# Patient Record
Sex: FEMALE | Race: WHITE | NOT HISPANIC OR LATINO | Employment: OTHER | URBAN - METROPOLITAN AREA
[De-identification: names, ages, dates, MRNs, and addresses within clinical notes are randomized per-mention and may not be internally consistent; named-entity substitution may affect disease eponyms.]

---

## 2023-07-03 ENCOUNTER — TELEPHONE (OUTPATIENT)
Dept: PRIMARY CARE | Facility: CLINIC | Age: 78
End: 2023-07-03
Payer: MEDICARE

## 2023-07-03 DIAGNOSIS — E55.9 VITAMIN D DEFICIENCY: ICD-10-CM

## 2023-07-03 DIAGNOSIS — E78.00 HYPERCHOLESTEREMIA: ICD-10-CM

## 2023-07-03 DIAGNOSIS — R53.83 OTHER FATIGUE: ICD-10-CM

## 2023-07-07 ENCOUNTER — OFFICE VISIT (OUTPATIENT)
Dept: PRIMARY CARE | Facility: CLINIC | Age: 78
End: 2023-07-07
Payer: MEDICARE

## 2023-07-07 ENCOUNTER — LAB (OUTPATIENT)
Dept: LAB | Facility: LAB | Age: 78
End: 2023-07-07
Payer: MEDICARE

## 2023-07-07 VITALS
BODY MASS INDEX: 32.25 KG/M2 | WEIGHT: 160 LBS | DIASTOLIC BLOOD PRESSURE: 64 MMHG | HEART RATE: 97 BPM | OXYGEN SATURATION: 97 % | TEMPERATURE: 97.1 F | RESPIRATION RATE: 16 BRPM | SYSTOLIC BLOOD PRESSURE: 124 MMHG | HEIGHT: 59 IN

## 2023-07-07 DIAGNOSIS — E55.9 VITAMIN D DEFICIENCY: ICD-10-CM

## 2023-07-07 DIAGNOSIS — R53.83 OTHER FATIGUE: ICD-10-CM

## 2023-07-07 DIAGNOSIS — E78.5 HYPERLIPIDEMIA, UNSPECIFIED HYPERLIPIDEMIA TYPE: ICD-10-CM

## 2023-07-07 DIAGNOSIS — E53.8 VITAMIN B 12 DEFICIENCY: ICD-10-CM

## 2023-07-07 DIAGNOSIS — K21.00 GASTROESOPHAGEAL REFLUX DISEASE WITH ESOPHAGITIS WITHOUT HEMORRHAGE: ICD-10-CM

## 2023-07-07 DIAGNOSIS — Z00.00 ENCOUNTER FOR ANNUAL GENERAL MEDICAL EXAMINATION WITHOUT ABNORMAL FINDINGS IN ADULT: ICD-10-CM

## 2023-07-07 DIAGNOSIS — R07.9 CHEST PAIN, UNSPECIFIED TYPE: ICD-10-CM

## 2023-07-07 DIAGNOSIS — E78.00 HYPERCHOLESTEREMIA: ICD-10-CM

## 2023-07-07 DIAGNOSIS — K21.9 GASTROESOPHAGEAL REFLUX DISEASE, UNSPECIFIED WHETHER ESOPHAGITIS PRESENT: Primary | ICD-10-CM

## 2023-07-07 PROCEDURE — 82306 VITAMIN D 25 HYDROXY: CPT

## 2023-07-07 PROCEDURE — 84443 ASSAY THYROID STIM HORMONE: CPT

## 2023-07-07 PROCEDURE — 1160F RVW MEDS BY RX/DR IN RCRD: CPT | Performed by: FAMILY MEDICINE

## 2023-07-07 PROCEDURE — 1036F TOBACCO NON-USER: CPT | Performed by: FAMILY MEDICINE

## 2023-07-07 PROCEDURE — 93000 ELECTROCARDIOGRAM COMPLETE: CPT | Performed by: FAMILY MEDICINE

## 2023-07-07 PROCEDURE — 99214 OFFICE O/P EST MOD 30 MIN: CPT | Performed by: FAMILY MEDICINE

## 2023-07-07 PROCEDURE — 85025 COMPLETE CBC W/AUTO DIFF WBC: CPT

## 2023-07-07 PROCEDURE — 36415 COLL VENOUS BLD VENIPUNCTURE: CPT

## 2023-07-07 PROCEDURE — 80053 COMPREHEN METABOLIC PANEL: CPT

## 2023-07-07 PROCEDURE — 82607 VITAMIN B-12: CPT

## 2023-07-07 PROCEDURE — 1159F MED LIST DOCD IN RCRD: CPT | Performed by: FAMILY MEDICINE

## 2023-07-07 PROCEDURE — 80061 LIPID PANEL: CPT

## 2023-07-07 RX ORDER — PITAVASTATIN CALCIUM 4.18 MG/1
25 TABLET, FILM COATED ORAL DAILY
COMMUNITY
End: 2023-11-17 | Stop reason: WASHOUT

## 2023-07-07 RX ORDER — PANTOPRAZOLE SODIUM 40 MG/1
40 TABLET, DELAYED RELEASE ORAL DAILY
Qty: 30 TABLET | Refills: 1 | Status: SHIPPED | OUTPATIENT
Start: 2023-07-07 | End: 2023-08-01

## 2023-07-07 RX ORDER — IBUPROFEN 600 MG/1
TABLET ORAL
COMMUNITY
Start: 2022-10-26

## 2023-07-07 ASSESSMENT — ENCOUNTER SYMPTOMS
ACTIVITY CHANGE: 0
DIARRHEA: 0
PALPITATIONS: 0
ABDOMINAL PAIN: 0
ABDOMINAL DISTENTION: 0
OCCASIONAL FEELINGS OF UNSTEADINESS: 0
LOSS OF SENSATION IN FEET: 0
FATIGUE: 0
VOMITING: 0
COLOR CHANGE: 0
DEPRESSION: 0
CHOKING: 0
COUGH: 0
HEADACHES: 0
CONSTIPATION: 0
NAUSEA: 0
ARTHRALGIAS: 0
BACK PAIN: 0
FACIAL ASYMMETRY: 0
RECTAL PAIN: 0
CHEST TIGHTNESS: 0
DIZZINESS: 0
LIGHT-HEADEDNESS: 0
FEVER: 0
APPETITE CHANGE: 0

## 2023-07-07 ASSESSMENT — PATIENT HEALTH QUESTIONNAIRE - PHQ9
1. LITTLE INTEREST OR PLEASURE IN DOING THINGS: NOT AT ALL
SUM OF ALL RESPONSES TO PHQ9 QUESTIONS 1 AND 2: 0
2. FEELING DOWN, DEPRESSED OR HOPELESS: NOT AT ALL

## 2023-07-07 NOTE — PROGRESS NOTES
"Subjective   Patient ID: Julia Antonio is a 78 y.o. female who presents for heartburn  (Off and on ).    HPI     Review of Systems   Constitutional:  Negative for activity change, appetite change, fatigue and fever.   HENT:  Negative for congestion.    Respiratory:  Negative for cough, choking and chest tightness.    Cardiovascular:  Negative for chest pain, palpitations and leg swelling.   Gastrointestinal:  Negative for abdominal distention, abdominal pain, constipation, diarrhea, nausea, rectal pain and vomiting.        Heartburn even with water   Musculoskeletal:  Negative for arthralgias, back pain and gait problem.   Skin:  Negative for color change and pallor.   Neurological:  Negative for dizziness, facial asymmetry, light-headedness and headaches.       Objective   /64   Pulse 97   Temp 36.2 °C (97.1 °F)   Resp 16   Ht 1.499 m (4' 11\")   Wt 72.6 kg (160 lb)   SpO2 97%   BMI 32.32 kg/m²   BSA Body surface area is 1.74 meters squared.      Physical Exam  Constitutional:       General: She is not in acute distress.     Appearance: Normal appearance. She is not toxic-appearing.   HENT:      Head: Normocephalic.      Right Ear: Tympanic membrane, ear canal and external ear normal.      Left Ear: Tympanic membrane, ear canal and external ear normal.      Mouth/Throat:      Pharynx: Oropharynx is clear.   Eyes:      Conjunctiva/sclera: Conjunctivae normal.      Pupils: Pupils are equal, round, and reactive to light.   Cardiovascular:      Rate and Rhythm: Normal rate and regular rhythm.      Pulses: Normal pulses.      Heart sounds: Normal heart sounds.   Pulmonary:      Effort: No respiratory distress.      Breath sounds: No wheezing, rhonchi or rales.   Abdominal:      General: Bowel sounds are normal. There is no distension.      Palpations: Abdomen is soft.      Tenderness: There is no abdominal tenderness.      Comments: Slight discomfort in epigastrium   Musculoskeletal:         General: No " swelling or tenderness.      Cervical back: No tenderness.   Skin:     Findings: No lesion or rash.   Neurological:      General: No focal deficit present.      Mental Status: She is alert and oriented to person, place, and time. Mental status is at baseline.      Gait: Gait normal.   Psychiatric:         Mood and Affect: Mood normal.         Behavior: Behavior normal.         Thought Content: Thought content normal.         Judgment: Judgment normal.       [facility] Encounter on 08/17/2022   Component Date Value Ref Range Status    BNP 08/17/2022 16  0 - 99 pg/mL Final    Comment: .  <100 pg/mL - Heart failure unlikely  100-299 pg/mL - Intermediate probability of acute heart  .               failure exacerbation. Correlate with clinical  .               context and patient history.    >=300 pg/mL - Heart Failure likely. Correlate with clinical  .               context and patient history.   Biotin interference may cause falsely decreased results.   Patients taking a Biotin dose of up to 5 mg/day should   refrain from taking Biotin for 24 hours before sample   collection. Providers may contact their local laboratory   for further information.     [facility] Encounter on 07/08/2022   Component Date Value Ref Range Status    WBC 07/08/2022 4.5  4.4 - 11.3 x10E9/L Final    nRBC 07/08/2022 0.0  0.0 - 0.0 /100 WBC Final    RBC 07/08/2022 4.46  4.00 - 5.20 x10E12/L Final    Hemoglobin 07/08/2022 13.4  12.0 - 16.0 g/dL Final    Hematocrit 07/08/2022 41.2  36.0 - 46.0 % Final    MCV 07/08/2022 92  80 - 100 fL Final    MCHC 07/08/2022 32.5  32.0 - 36.0 g/dL Final    Platelets 07/08/2022 236  150 - 450 x10E9/L Final    RDW 07/08/2022 11.9  11.5 - 14.5 % Final    Neutrophils % 07/08/2022 58.8  40.0 - 80.0 % Final    Immature Granulocytes %, Automated 07/08/2022 0.2  0.0 - 0.9 % Final    Comment:  Immature Granulocyte Count (IG) includes promyelocytes,    myelocytes and metamyelocytes but does not include bands.   Percent differential  counts (%) should be interpreted in the   context of the absolute cell counts (cells/L).      Lymphocytes % 07/08/2022 25.3  13.0 - 44.0 % Final    Monocytes % 07/08/2022 12.8  2.0 - 10.0 % Final    Eosinophils % 07/08/2022 2.2  0.0 - 6.0 % Final    Basophils % 07/08/2022 0.7  0.0 - 2.0 % Final    Neutrophils Absolute 07/08/2022 2.62  1.60 - 5.50 x10E9/L Final    Lymphocytes Absolute 07/08/2022 1.13  0.80 - 3.00 x10E9/L Final    Monocytes Absolute 07/08/2022 0.57  0.05 - 0.80 x10E9/L Final    Eosinophils Absolute 07/08/2022 0.10  0.00 - 0.40 x10E9/L Final    Basophils Absolute 07/08/2022 0.03  0.00 - 0.10 x10E9/L Final    Glucose 07/08/2022 100 (H)  74 - 99 mg/dL Final    Sodium 07/08/2022 142  136 - 145 mmol/L Final    Potassium 07/08/2022 4.8  3.5 - 5.3 mmol/L Final    Chloride 07/08/2022 105  98 - 107 mmol/L Final    Bicarbonate 07/08/2022 28  21 - 32 mmol/L Final    Anion Gap 07/08/2022 14  10 - 20 mmol/L Final    Urea Nitrogen 07/08/2022 15  6 - 23 mg/dL Final    Creatinine 07/08/2022 0.74  0.50 - 1.05 mg/dL Final    GFR Female 07/08/2022 82  >90 mL/min/1.73m2 Final    Comment:  CALCULATIONS OF ESTIMATED GFR ARE PERFORMED   USING THE 2021 CKD-EPI STUDY REFIT EQUATION   WITHOUT THE RACE VARIABLE FOR THE IDMS-TRACEABLE   CREATININE METHODS.    https://jasn.asnjournals.org/content/early/2021/09/22/ASN.8794511990      Calcium 07/08/2022 9.2  8.6 - 10.6 mg/dL Final    Albumin 07/08/2022 4.1  3.4 - 5.0 g/dL Final    Alkaline Phosphatase 07/08/2022 73  33 - 136 U/L Final    Total Protein 07/08/2022 6.8  6.4 - 8.2 g/dL Final    AST 07/08/2022 19  9 - 39 U/L Final    Total Bilirubin 07/08/2022 0.6  0.0 - 1.2 mg/dL Final    ALT (SGPT) 07/08/2022 15  7 - 45 U/L Final    Comment:  Patients treated with Sulfasalazine may generate    falsely decreased results for ALT.      Cholesterol 07/08/2022 173  0 - 199 mg/dL Final    Comment: .      AGE      DESIRABLE   BORDERLINE HIGH   HIGH     0-19 Y     0 - 169       170 - 199      >/= 200    20-24 Y     0 - 189       190 - 224     >/= 225         >24 Y     0 - 199       200 - 239     >/= 240   **All ranges are based on fasting samples. Specific   therapeutic targets will vary based on patient-specific   cardiac risk.  .   Pediatric guidelines reference:Pediatrics 2011, 128(S5).   Adult guidelines reference: NCEP ATPIII Guidelines,     SAURABH 2001, 258:2486-97  .   Venipuncture immediately after or during the    administration of Metamizole may lead to falsely   low results. Testing should be performed immediately   prior to Metamizole dosing.      HDL 07/08/2022 57.6  mg/dL Final    Comment: .      AGE      VERY LOW   LOW     NORMAL    HIGH       0-19 Y       < 35   < 40     40-45     ----    20-24 Y       ----   < 40       >45     ----      >24 Y       ----   < 40     40-60      >60  .      Cholesterol/HDL Ratio 07/08/2022 3.0   Final    Comment: REF VALUES  DESIRABLE  < 3.4  HIGH RISK  > 5.0      LDL 07/08/2022 95  0 - 99 mg/dL Final    Comment: .                           NEAR      BORD      AGE      DESIRABLE  OPTIMAL    HIGH     HIGH     VERY HIGH     0-19 Y     0 - 109     ---    110-129   >/= 130     ----    20-24 Y     0 - 119     ---    120-159   >/= 160     ----      >24 Y     0 -  99   100-129  130-159   160-189     >/=190  .      VLDL 07/08/2022 21  0 - 40 mg/dL Final    Triglycerides 07/08/2022 104  0 - 149 mg/dL Final    Comment: .      AGE      DESIRABLE   BORDERLINE HIGH   HIGH     VERY HIGH   0 D-90 D    19 - 174         ----         ----        ----  91 D- 9 Y     0 -  74        75 -  99     >/= 100      ----    10-19 Y     0 -  89        90 - 129     >/= 130      ----    20-24 Y     0 - 114       115 - 149     >/= 150      ----         >24 Y     0 - 149       150 - 199    200- 499    >/= 500  .   Venipuncture immediately after or during the    administration of Metamizole may lead to falsely   low results. Testing should be performed immediately   prior to Metamizole  dosing.      Vitamin D, 25-Hydroxy 07/08/2022 31  ng/mL Final    Comment: .  DEFICIENCY:         < 20   NG/ML  INSUFFICIENCY:      20-29  NG/ML  SUFFICIENCY:         NG/ML    THIS ASSAY ACCURATELY QUANTIFIES THE SUM OF  VITAMIN D3, 25-HYDROXY AND VIT D2,25-HYDROXY.      TSH 07/08/2022 2.75  0.44 - 3.98 mIU/L Final    Comment:  TSH testing is performed using different testing    methodology at [facility] than at other    [facility]. Direct result comparisons should    only be made within the same method.       Current Outpatient Medications on File Prior to Visit   Medication Sig Dispense Refill    ibuprofen 600 mg tablet TAKE ONE TABLET BY MOUTH THREE TIMES DAILY WITH FOOD AS NEEDED      Livalo 4 mg tablet Take 25 mg by mouth once daily.       No current facility-administered medications on file prior to visit.     No images are attached to the encounter.            Assessment/Plan   Diagnoses and all orders for this visit:  Gastroesophageal reflux disease, unspecified whether esophagitis present  -     pantoprazole (ProtoNix) 40 mg EC tablet; Take 1 tablet (40 mg) by mouth once daily. Do not crush, chew, or split.  -     ECG 12 lead (Clinic Performed)  Chest pain, unspecified type  -     ECG 12 lead (Clinic Performed)    Patient to see Dr. Stevens for EGD  Patient to start on pantoprazole  Patient to call if worsening symptoms

## 2023-07-08 LAB
ALANINE AMINOTRANSFERASE (SGPT) (U/L) IN SER/PLAS: 20 U/L (ref 7–45)
ALBUMIN (G/DL) IN SER/PLAS: 4.6 G/DL (ref 3.4–5)
ALKALINE PHOSPHATASE (U/L) IN SER/PLAS: 75 U/L (ref 33–136)
ANION GAP IN SER/PLAS: 14 MMOL/L (ref 10–20)
ASPARTATE AMINOTRANSFERASE (SGOT) (U/L) IN SER/PLAS: 24 U/L (ref 9–39)
BASOPHILS (10*3/UL) IN BLOOD BY AUTOMATED COUNT: 0.02 X10E9/L (ref 0–0.1)
BASOPHILS/100 LEUKOCYTES IN BLOOD BY AUTOMATED COUNT: 0.4 % (ref 0–2)
BILIRUBIN TOTAL (MG/DL) IN SER/PLAS: 0.6 MG/DL (ref 0–1.2)
CALCIDIOL (25 OH VITAMIN D3) (NG/ML) IN SER/PLAS: 25 NG/ML
CALCIUM (MG/DL) IN SER/PLAS: 9.7 MG/DL (ref 8.6–10.6)
CARBON DIOXIDE, TOTAL (MMOL/L) IN SER/PLAS: 28 MMOL/L (ref 21–32)
CHLORIDE (MMOL/L) IN SER/PLAS: 103 MMOL/L (ref 98–107)
CHOLESTEROL (MG/DL) IN SER/PLAS: 190 MG/DL (ref 0–199)
CHOLESTEROL IN HDL (MG/DL) IN SER/PLAS: 59.6 MG/DL
CHOLESTEROL/HDL RATIO: 3.2
COBALAMIN (VITAMIN B12) (PG/ML) IN SER/PLAS: 337 PG/ML (ref 211–911)
CREATININE (MG/DL) IN SER/PLAS: 0.71 MG/DL (ref 0.5–1.05)
EOSINOPHILS (10*3/UL) IN BLOOD BY AUTOMATED COUNT: 0.09 X10E9/L (ref 0–0.4)
EOSINOPHILS/100 LEUKOCYTES IN BLOOD BY AUTOMATED COUNT: 1.7 % (ref 0–6)
ERYTHROCYTE DISTRIBUTION WIDTH (RATIO) BY AUTOMATED COUNT: 12.8 % (ref 11.5–14.5)
ERYTHROCYTE MEAN CORPUSCULAR HEMOGLOBIN CONCENTRATION (G/DL) BY AUTOMATED: 31.4 G/DL (ref 32–36)
ERYTHROCYTE MEAN CORPUSCULAR VOLUME (FL) BY AUTOMATED COUNT: 93 FL (ref 80–100)
ERYTHROCYTES (10*6/UL) IN BLOOD BY AUTOMATED COUNT: 4.43 X10E12/L (ref 4–5.2)
GFR FEMALE: 86 ML/MIN/1.73M2
GLUCOSE (MG/DL) IN SER/PLAS: 93 MG/DL (ref 74–99)
HEMATOCRIT (%) IN BLOOD BY AUTOMATED COUNT: 41.4 % (ref 36–46)
HEMOGLOBIN (G/DL) IN BLOOD: 13 G/DL (ref 12–16)
IMMATURE GRANULOCYTES/100 LEUKOCYTES IN BLOOD BY AUTOMATED COUNT: 0.2 % (ref 0–0.9)
LDL: 97 MG/DL (ref 0–99)
LEUKOCYTES (10*3/UL) IN BLOOD BY AUTOMATED COUNT: 5.4 X10E9/L (ref 4.4–11.3)
LYMPHOCYTES (10*3/UL) IN BLOOD BY AUTOMATED COUNT: 1.48 X10E9/L (ref 0.8–3)
LYMPHOCYTES/100 LEUKOCYTES IN BLOOD BY AUTOMATED COUNT: 27.3 % (ref 13–44)
MONOCYTES (10*3/UL) IN BLOOD BY AUTOMATED COUNT: 0.63 X10E9/L (ref 0.05–0.8)
MONOCYTES/100 LEUKOCYTES IN BLOOD BY AUTOMATED COUNT: 11.6 % (ref 2–10)
NEUTROPHILS (10*3/UL) IN BLOOD BY AUTOMATED COUNT: 3.2 X10E9/L (ref 1.6–5.5)
NEUTROPHILS/100 LEUKOCYTES IN BLOOD BY AUTOMATED COUNT: 58.8 % (ref 40–80)
NRBC (PER 100 WBCS) BY AUTOMATED COUNT: 0 /100 WBC (ref 0–0)
PLATELETS (10*3/UL) IN BLOOD AUTOMATED COUNT: 218 X10E9/L (ref 150–450)
POTASSIUM (MMOL/L) IN SER/PLAS: 4.8 MMOL/L (ref 3.5–5.3)
PROTEIN TOTAL: 7.1 G/DL (ref 6.4–8.2)
SODIUM (MMOL/L) IN SER/PLAS: 140 MMOL/L (ref 136–145)
THYROTROPIN (MIU/L) IN SER/PLAS BY DETECTION LIMIT <= 0.05 MIU/L: 2.62 MIU/L (ref 0.44–3.98)
TRIGLYCERIDE (MG/DL) IN SER/PLAS: 167 MG/DL (ref 0–149)
UREA NITROGEN (MG/DL) IN SER/PLAS: 14 MG/DL (ref 6–23)
VLDL: 33 MG/DL (ref 0–40)

## 2023-07-14 ENCOUNTER — TELEPHONE (OUTPATIENT)
Dept: PRIMARY CARE | Facility: CLINIC | Age: 78
End: 2023-07-14
Payer: MEDICARE

## 2023-08-01 DIAGNOSIS — K21.9 GASTROESOPHAGEAL REFLUX DISEASE, UNSPECIFIED WHETHER ESOPHAGITIS PRESENT: ICD-10-CM

## 2023-08-01 RX ORDER — PANTOPRAZOLE SODIUM 40 MG/1
40 TABLET, DELAYED RELEASE ORAL DAILY
Qty: 30 TABLET | Refills: 1 | Status: SHIPPED | OUTPATIENT
Start: 2023-08-01 | End: 2023-08-25

## 2023-08-16 ASSESSMENT — ENCOUNTER SYMPTOMS
BACK PAIN: 0
COLOR CHANGE: 0
ARTHRALGIAS: 0
FACIAL ASYMMETRY: 0
APPETITE CHANGE: 0
LIGHT-HEADEDNESS: 0
DIZZINESS: 0
CHOKING: 0
FEVER: 0
CHEST TIGHTNESS: 0
PALPITATIONS: 0
FATIGUE: 0
ACTIVITY CHANGE: 0
HEADACHES: 0
COUGH: 0

## 2023-08-16 NOTE — PROGRESS NOTES
"Subjective   Reason for Visit: Julia Antonio is an 78 y.o. female here for a Medicare Wellness visit.     Past Medical, Surgical, and Family History reviewed and updated in chart.    Reviewed all medications by prescribing practitioner or clinical pharmacist (such as prescriptions, OTCs, herbal therapies and supplements) and documented in the medical record.    HPI  Patient presents for physical exam.     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 Solomon     Patient denies other complaints.   Patient Self Assessment of Health Status  Patient Self Assessment: Good    Nutrition and Exercise  Current Diet: Well Balanced Diet  Adequate Fluid Intake: No  Caffeine: Yes  Exercise Frequency: Regularly    Functional Ability/Level of Safety  Cognitive Impairment Observed: No cognitive impairment observed  Cognitive Impairment Reported: No cognitive impairment reported by patient or family    Home Safety Risk Factors: None    Patient Care Team:  Heaven Fletcher DO as PCP - General  Heaven Fletcher DO as PCP - [facility] Attributed Provider  Lucho Stevens DO as Referring Physician (Gastroenterology)     Review of Systems   Constitutional:  Negative for activity change, appetite change, fatigue and fever.   HENT:  Negative for congestion.    Respiratory:  Negative for cough, choking and chest tightness.    Cardiovascular:  Negative for chest pain, palpitations and leg swelling.   Musculoskeletal:  Negative for arthralgias, back pain and gait problem.   Skin:  Negative for color change and pallor.   Neurological:  Negative for dizziness, facial asymmetry, light-headedness and headaches.       Objective   Vitals:  /68 (BP Location: Left arm)   Pulse 90   Ht 1.492 m (4' 10.75\")   Wt 72.9 kg (160 lb 12.8 oz)  "  BMI 32.75 kg/m²       Physical Exam  Constitutional:       General: She is not in acute distress.     Appearance: Normal appearance. She is not toxic-appearing.   HENT:      Head: Normocephalic.      Right Ear: Tympanic membrane, ear canal and external ear normal.      Left Ear: Tympanic membrane, ear canal and external ear normal.      Nose: Nose normal.      Mouth/Throat:      Pharynx: Oropharynx is clear.   Eyes:      Conjunctiva/sclera: Conjunctivae normal.      Pupils: Pupils are equal, round, and reactive to light.   Cardiovascular:      Rate and Rhythm: Normal rate and regular rhythm.      Pulses: Normal pulses.      Heart sounds: Normal heart sounds.   Pulmonary:      Effort: No respiratory distress.      Breath sounds: No wheezing, rhonchi or rales.   Abdominal:      General: Bowel sounds are normal. There is no distension.      Palpations: Abdomen is soft.      Tenderness: There is no abdominal tenderness.   Musculoskeletal:         General: No swelling or tenderness.      Cervical back: No tenderness.   Skin:     Findings: No lesion or rash.   Neurological:      General: No focal deficit present.      Mental Status: She is alert and oriented to person, place, and time. Mental status is at baseline.      Gait: Gait normal.   Psychiatric:         Mood and Affect: Mood normal.         Behavior: Behavior normal.         Thought Content: Thought content normal.         Judgment: Judgment normal.         Assessment/Plan   Problem List Items Addressed This Visit    None  Visit Diagnoses       Healthcare maintenance    -  Primary          1.  Patient's blood work discussed at this office visit  2.  Patient's LDL goal <100, current LDL 97  3.  Patient's triglyceride goal less than 150, current triglycerides 167, start on TYPE IV diet  4.  Patient's vitamin D level low start vitamin D3 2000 units daily  5.  Mammogram due 2023  6.  DEXA 2010 osteoporosis due 2023  7.  Colonoscopy 2011 due 2023 Dr. Solomon  8.   Patient to call if questions or concerns

## 2023-08-17 ENCOUNTER — OFFICE VISIT (OUTPATIENT)
Dept: PRIMARY CARE | Facility: CLINIC | Age: 78
End: 2023-08-17
Payer: MEDICARE

## 2023-08-17 VITALS
SYSTOLIC BLOOD PRESSURE: 142 MMHG | WEIGHT: 160.8 LBS | HEIGHT: 59 IN | BODY MASS INDEX: 32.42 KG/M2 | DIASTOLIC BLOOD PRESSURE: 68 MMHG | HEART RATE: 90 BPM

## 2023-08-17 DIAGNOSIS — Z78.0 ASYMPTOMATIC MENOPAUSAL STATE: ICD-10-CM

## 2023-08-17 DIAGNOSIS — R32 URINARY INCONTINENCE, UNSPECIFIED TYPE: ICD-10-CM

## 2023-08-17 DIAGNOSIS — H92.01 RIGHT EAR PAIN: ICD-10-CM

## 2023-08-17 DIAGNOSIS — Z12.4 SCREENING FOR CERVICAL CANCER: ICD-10-CM

## 2023-08-17 DIAGNOSIS — M54.40 ACUTE MIDLINE LOW BACK PAIN WITH SCIATICA, SCIATICA LATERALITY UNSPECIFIED: ICD-10-CM

## 2023-08-17 DIAGNOSIS — R39.81 FUNCTIONAL URINARY INCONTINENCE: ICD-10-CM

## 2023-08-17 DIAGNOSIS — Z00.00 ROUTINE GENERAL MEDICAL EXAMINATION AT HEALTH CARE FACILITY: ICD-10-CM

## 2023-08-17 DIAGNOSIS — Z71.89 ADVANCE DIRECTIVE DISCUSSED WITH PATIENT: ICD-10-CM

## 2023-08-17 DIAGNOSIS — Z00.00 HEALTHCARE MAINTENANCE: Primary | ICD-10-CM

## 2023-08-17 DIAGNOSIS — Z12.31 ENCOUNTER FOR SCREENING MAMMOGRAM FOR BREAST CANCER: ICD-10-CM

## 2023-08-17 DIAGNOSIS — Z78.9 STATIN INTOLERANCE: ICD-10-CM

## 2023-08-17 DIAGNOSIS — Z13.89 ENCOUNTER FOR SCREENING FOR OTHER DISORDER: ICD-10-CM

## 2023-08-17 PROBLEM — M25.562 LEFT KNEE PAIN: Status: ACTIVE | Noted: 2023-08-17

## 2023-08-17 PROBLEM — G45.3 AMAUROSIS FUGAX: Status: ACTIVE | Noted: 2023-08-17

## 2023-08-17 PROBLEM — B37.0 ORAL THRUSH: Status: ACTIVE | Noted: 2023-08-17

## 2023-08-17 PROBLEM — R42 LIGHTHEADEDNESS: Status: ACTIVE | Noted: 2023-08-17

## 2023-08-17 PROBLEM — H61.20 CERUMEN IMPACTION: Status: ACTIVE | Noted: 2023-08-17

## 2023-08-17 PROBLEM — M54.50 LOW BACK PAIN: Status: ACTIVE | Noted: 2023-08-17

## 2023-08-17 PROBLEM — N64.4 BREAST TENDERNESS IN FEMALE: Status: ACTIVE | Noted: 2023-08-17

## 2023-08-17 PROBLEM — M89.8X9 BONE PAIN: Status: ACTIVE | Noted: 2023-08-17

## 2023-08-17 PROBLEM — G45.9 TIA (TRANSIENT ISCHEMIC ATTACK): Status: ACTIVE | Noted: 2023-08-17

## 2023-08-17 PROBLEM — R23.9 SKIN CHANGE: Status: ACTIVE | Noted: 2023-08-17

## 2023-08-17 PROBLEM — B07.0 PLANTAR WARTS: Status: ACTIVE | Noted: 2023-08-17

## 2023-08-17 PROBLEM — B35.4 RINGWORM, BODY: Status: ACTIVE | Noted: 2023-08-17

## 2023-08-17 PROBLEM — M54.2 NECK PAIN: Status: ACTIVE | Noted: 2023-08-17

## 2023-08-17 PROBLEM — B02.9 SHINGLES: Status: ACTIVE | Noted: 2023-08-17

## 2023-08-17 PROBLEM — S61.411A LACERATION OF HAND, RIGHT: Status: ACTIVE | Noted: 2023-08-17

## 2023-08-17 PROBLEM — R60.9 EDEMA: Status: ACTIVE | Noted: 2023-08-17

## 2023-08-17 PROBLEM — R03.0 ELEVATED BLOOD PRESSURE READING WITHOUT DIAGNOSIS OF HYPERTENSION: Status: ACTIVE | Noted: 2023-08-17

## 2023-08-17 PROBLEM — I34.0 MITRAL VALVE REGURGITATION: Status: ACTIVE | Noted: 2023-08-17

## 2023-08-17 PROBLEM — H60.91 RIGHT OTITIS EXTERNA: Status: ACTIVE | Noted: 2023-08-17

## 2023-08-17 PROBLEM — B35.3 TINEA PEDIS OF BOTH FEET: Status: ACTIVE | Noted: 2023-08-17

## 2023-08-17 PROBLEM — S13.4XXA WHIPLASH: Status: ACTIVE | Noted: 2023-08-17

## 2023-08-17 PROBLEM — I35.9 AORTIC VALVE DISORDER: Status: ACTIVE | Noted: 2023-08-17

## 2023-08-17 PROBLEM — C44.92 SQUAMOUS CELL SKIN CANCER: Status: ACTIVE | Noted: 2023-08-17

## 2023-08-17 PROBLEM — H49.41: Status: ACTIVE | Noted: 2023-08-17

## 2023-08-17 LAB
POC BILIRUBIN, URINE: NEGATIVE
POC BLOOD, URINE: NEGATIVE
POC GLUCOSE, URINE: NEGATIVE MG/DL
POC KETONES, URINE: NEGATIVE MG/DL
POC LEUKOCYTES, URINE: NEGATIVE
POC NITRITE,URINE: NEGATIVE
POC PH, URINE: 5.5 PH
POC PROTEIN, URINE: NEGATIVE MG/DL
POC SPECIFIC GRAVITY, URINE: >=1.03
POC UROBILINOGEN, URINE: 0.2 EU/DL

## 2023-08-17 PROCEDURE — 81003 URINALYSIS AUTO W/O SCOPE: CPT | Performed by: FAMILY MEDICINE

## 2023-08-17 PROCEDURE — 1160F RVW MEDS BY RX/DR IN RCRD: CPT | Performed by: FAMILY MEDICINE

## 2023-08-17 PROCEDURE — 1159F MED LIST DOCD IN RCRD: CPT | Performed by: FAMILY MEDICINE

## 2023-08-17 PROCEDURE — 88175 CYTOPATH C/V AUTO FLUID REDO: CPT

## 2023-08-17 PROCEDURE — 1170F FXNL STATUS ASSESSED: CPT | Performed by: FAMILY MEDICINE

## 2023-08-17 PROCEDURE — G0444 DEPRESSION SCREEN ANNUAL: HCPCS | Performed by: FAMILY MEDICINE

## 2023-08-17 PROCEDURE — 1036F TOBACCO NON-USER: CPT | Performed by: FAMILY MEDICINE

## 2023-08-17 PROCEDURE — 99497 ADVNCD CARE PLAN 30 MIN: CPT | Performed by: FAMILY MEDICINE

## 2023-08-17 PROCEDURE — 1158F ADVNC CARE PLAN TLK DOCD: CPT | Performed by: FAMILY MEDICINE

## 2023-08-17 PROCEDURE — 87624 HPV HI-RISK TYP POOLED RSLT: CPT

## 2023-08-17 PROCEDURE — 99214 OFFICE O/P EST MOD 30 MIN: CPT | Performed by: FAMILY MEDICINE

## 2023-08-17 PROCEDURE — G0439 PPPS, SUBSEQ VISIT: HCPCS | Performed by: FAMILY MEDICINE

## 2023-08-17 ASSESSMENT — ACTIVITIES OF DAILY LIVING (ADL)
DRESSING: INDEPENDENT
BATHING: INDEPENDENT
TAKING_MEDICATION: INDEPENDENT
GROCERY_SHOPPING: INDEPENDENT
DOING_HOUSEWORK: INDEPENDENT
MANAGING_FINANCES: INDEPENDENT

## 2023-08-17 ASSESSMENT — PATIENT HEALTH QUESTIONNAIRE - PHQ9
1. LITTLE INTEREST OR PLEASURE IN DOING THINGS: NOT AT ALL
2. FEELING DOWN, DEPRESSED OR HOPELESS: NOT AT ALL
SUM OF ALL RESPONSES TO PHQ9 QUESTIONS 1 AND 2: 0

## 2023-08-17 ASSESSMENT — ENCOUNTER SYMPTOMS
DEPRESSION: 0
OCCASIONAL FEELINGS OF UNSTEADINESS: 0
LOSS OF SENSATION IN FEET: 0

## 2023-08-17 NOTE — ACP (ADVANCE CARE PLANNING)
Confirming Previous Code Status:   Advance Care Planning Note     Discussion Date: 08/17/23   Discussion Participants: patient    The patient wishes to discuss Advance Care Planning today and the following is a brief summary of our discussion.     Patient has capacity to make their own medical decisions: Yes  Health Care Agent/Surrogate Decision Maker documented in chart: Yes    Documents on file and valid:  Advance Directive/Living Will: No   Health Care Power of : No  Other:     Communication of Medical Status/Prognosis:   stable    Communication of Treatment Goals/Options:   stable    Treatment Decisions  Goals of Care: survival is paramount regardless of prognosis, treatment outcome, or burden     Follow Up Plan  Stable  Team Members  stable  Time Statement: Total face to face time spent on advance care planning was 5 minutes with 16 minutes spent in counseling, including the explanation.    Heaven Fletcher DO  8/17/2023 10:40 AM

## 2023-08-18 ENCOUNTER — APPOINTMENT (OUTPATIENT)
Dept: PRIMARY CARE | Facility: CLINIC | Age: 78
End: 2023-08-18
Payer: MEDICARE

## 2023-08-24 DIAGNOSIS — K21.9 GASTROESOPHAGEAL REFLUX DISEASE, UNSPECIFIED WHETHER ESOPHAGITIS PRESENT: ICD-10-CM

## 2023-08-24 LAB
COMPLETE PATHOLOGY REPORT: NORMAL
CONVERTED CLINICAL DIAGNOSIS-HISTORY: NORMAL
CONVERTED DIAGNOSIS COMMENT: NORMAL
CONVERTED FINAL DIAGNOSIS: NORMAL
CONVERTED FINAL REPORT PDF LINK TO COPY AND PASTE: NORMAL

## 2023-08-25 RX ORDER — PANTOPRAZOLE SODIUM 40 MG/1
40 TABLET, DELAYED RELEASE ORAL DAILY
Qty: 30 TABLET | Refills: 1 | Status: SHIPPED | OUTPATIENT
Start: 2023-08-25 | End: 2023-08-28

## 2023-08-26 DIAGNOSIS — K21.9 GASTROESOPHAGEAL REFLUX DISEASE, UNSPECIFIED WHETHER ESOPHAGITIS PRESENT: ICD-10-CM

## 2023-08-28 RX ORDER — PANTOPRAZOLE SODIUM 40 MG/1
40 TABLET, DELAYED RELEASE ORAL DAILY
Qty: 90 TABLET | Refills: 1 | Status: SHIPPED | OUTPATIENT
Start: 2023-08-28

## 2023-09-05 ENCOUNTER — TELEMEDICINE (OUTPATIENT)
Dept: PHARMACY | Facility: HOSPITAL | Age: 78
End: 2023-09-05
Payer: MEDICARE

## 2023-09-05 DIAGNOSIS — Z78.9 STATIN INTOLERANCE: ICD-10-CM

## 2023-09-05 DIAGNOSIS — E78.00 HYPERCHOLESTEREMIA: Primary | ICD-10-CM

## 2023-09-05 RX ORDER — EZETIMIBE 10 MG/1
10 TABLET ORAL DAILY
Qty: 30 TABLET | Refills: 11 | Status: SHIPPED | OUTPATIENT
Start: 2023-09-05 | End: 2024-09-04

## 2023-09-05 ASSESSMENT — ENCOUNTER SYMPTOMS: MYALGIAS: 1

## 2023-09-05 NOTE — ASSESSMENT & PLAN NOTE
Assessment   Patient's current LDL is 97 (LDL goal <100)  and patient's triglyceride is currently at 167 (goal <150).  Patient previously got myalgias from statin and heartburn from livalo. Patient unable to tolerate both medications.   Patient's BMI level is at 32.75, weight loss medication, Wegovy is not covered by her insurance.   Patient practice healthy diet.    Plan  Start Zetia 10 mg take 1 tablet by mouth once daily.  Counseled patient on the MOA and side effects of Zetia.  Discontinue Livalo 4 mg tablet.   Continue all other medications as prescribed by your provider.  Follow up with patient in 2 weeks to assess Zetia tolerance, 09/19/2023.

## 2023-09-05 NOTE — PROGRESS NOTES
"Subjective   Patient ID: Julia Antonio is a 78 y.o. female who presents for Hyperlipidemia.    Referring Provider: Heaven Fletcher, DO     Hyperlipidemia  The problem is uncontrolled. Associated symptoms include myalgias. Compliance problems include medication side effects.      Patient was referred to the Pharmacy team for the management of hyperlipidemia. From the last provider's visit, patient's LDL was at 97 (LDL goal <100) and Patient's triglyceride level was at 167 (goal less than 150). Patient previously got myalgias from statin and heartburn from livalo. Patient agrees to start Zetia which is covered by her insurance for $10. Also, patient's BMI level is at 32.75, patient requested for a weight loss medication however, Wegovy is not covered by her insurance. Patient reported a healthy diet.    Review of Systems   Musculoskeletal:  Positive for myalgias.       Objective     There were no vitals taken for this visit.     Labs  Lab Results   Component Value Date    BILITOT 0.6 07/07/2023    CALCIUM 9.7 07/07/2023    CO2 28 07/07/2023     07/07/2023    CREATININE 0.71 07/07/2023    GLUCOSE 93 07/07/2023    ALKPHOS 75 07/07/2023    K 4.8 07/07/2023    PROT 7.1 07/07/2023     07/07/2023    AST 24 07/07/2023    ALT 20 07/07/2023    BUN 14 07/07/2023    ANIONGAP 14 07/07/2023    ALBUMIN 4.6 07/07/2023    GFRF 86 07/07/2023     Lab Results   Component Value Date    TRIG 167 (H) 07/07/2023    CHOL 190 07/07/2023    HDL 59.6 07/07/2023     No results found for: \"HGBA1C\"    Current Outpatient Medications on File Prior to Visit   Medication Sig Dispense Refill    ibuprofen 600 mg tablet TAKE ONE TABLET BY MOUTH THREE TIMES DAILY WITH FOOD AS NEEDED      Livalo 4 mg tablet Take 25 mg by mouth once daily.      pantoprazole (ProtoNix) 40 mg EC tablet TAKE 1 TABLET (40 MG) BY MOUTH DAILY DO NOT CRUSH, CHEW, OR SPLIT 90 tablet 1     No current facility-administered medications on file prior to visit.    "     Assessment/Plan   Problem List Items Addressed This Visit       Hypercholesteremia     Assessment   Patient's current LDL is 97 (LDL goal <100)  and patient's triglyceride is currently at 167 (goal <150).  Patient previously got myalgias from statin and heartburn from livalo. Patient unable to tolerate both medications.   Patient's BMI level is at 32.75, weight loss medication, Wegovy is not covered by her insurance.   Patient practice healthy diet.    Plan  Start Zetia 10 mg take 1 tablet by mouth once daily.  Counseled patient on the MOA and side effects of Zetia.  Discontinue Livalo 4 mg tablet.   Continue all other medications as prescribed by your provider.  Follow up with patient in 2 weeks to assess Zetia tolerance, 09/19/2023.         Relevant Medications    ezetimibe (Zetia) 10 mg tablet     Other Visit Diagnoses       BMI 32.0-32.9,adult        Relevant Medications    ezetimibe (Zetia) 10 mg tablet    Statin intolerance        Relevant Medications    ezetimibe (Zetia) 10 mg tablet            Lillian Kristofero    Continue all meds under the continuation of care with the referring provider and clinical pharmacy team.

## 2023-09-12 ENCOUNTER — OFFICE VISIT (OUTPATIENT)
Dept: PRIMARY CARE | Facility: CLINIC | Age: 78
End: 2023-09-12
Payer: MEDICARE

## 2023-09-12 VITALS
SYSTOLIC BLOOD PRESSURE: 130 MMHG | HEART RATE: 93 BPM | WEIGHT: 161 LBS | BODY MASS INDEX: 32.8 KG/M2 | DIASTOLIC BLOOD PRESSURE: 78 MMHG

## 2023-09-12 DIAGNOSIS — M81.0 AGE RELATED OSTEOPOROSIS, UNSPECIFIED PATHOLOGICAL FRACTURE PRESENCE: Primary | ICD-10-CM

## 2023-09-12 PROCEDURE — 1160F RVW MEDS BY RX/DR IN RCRD: CPT | Performed by: FAMILY MEDICINE

## 2023-09-12 PROCEDURE — 1159F MED LIST DOCD IN RCRD: CPT | Performed by: FAMILY MEDICINE

## 2023-09-12 PROCEDURE — 1036F TOBACCO NON-USER: CPT | Performed by: FAMILY MEDICINE

## 2023-09-12 PROCEDURE — 99214 OFFICE O/P EST MOD 30 MIN: CPT | Performed by: FAMILY MEDICINE

## 2023-09-12 ASSESSMENT — ENCOUNTER SYMPTOMS
ACTIVITY CHANGE: 0
BACK PAIN: 0
LIGHT-HEADEDNESS: 0
CHEST TIGHTNESS: 0
COLOR CHANGE: 0
PALPITATIONS: 0
COUGH: 0
ARTHRALGIAS: 0
CHOKING: 0
FATIGUE: 0
FACIAL ASYMMETRY: 0
HEADACHES: 0
APPETITE CHANGE: 0
FEVER: 0
DIZZINESS: 0

## 2023-09-19 ENCOUNTER — TELEMEDICINE (OUTPATIENT)
Dept: PHARMACY | Facility: HOSPITAL | Age: 78
End: 2023-09-19
Payer: MEDICARE

## 2023-09-19 DIAGNOSIS — E78.00 HYPERCHOLESTEREMIA: ICD-10-CM

## 2023-09-19 DIAGNOSIS — Z78.9 STATIN INTOLERANCE: ICD-10-CM

## 2023-09-21 ASSESSMENT — ENCOUNTER SYMPTOMS: MYALGIAS: 1

## 2023-09-21 NOTE — PROGRESS NOTES
"Subjective   Patient ID: Julia Antonio is a 78 y.o. female who presents for Hyperlipidemia and Weight Loss.    Referring Provider: Heaven Fletcher, DO     Hyperlipidemia  The problem is uncontrolled. Associated symptoms include myalgias. Compliance problems include medication side effects.      At last visit, patient was referred to the Pharmacy team for the management of hyperlipidemia. From the last provider's visit, patient's LDL was at 97 (LDL goal <100) and Patient's triglyceride level was at 167 (goal less than 150). Patient previously got myalgias from statin and heartburn from livalo. Patient started Zetia which was covered by her insurance for $10. She has tolerated well, however had her blood pressure checked last week and it was 150/90 and was concerned zetia may be causing high BP. Also, patient's BMI level is at 32.75, patient requested for a weight loss medication however, Wegovy is not covered by medicare part d. She is curious about the [employer] diet/drops.     Review of Systems   Musculoskeletal:  Positive for myalgias.       Objective         9/13/2021    11:55 AM 10/15/2021    12:59 PM 4/28/2022     2:31 PM 8/16/2022     1:02 PM 7/7/2023    10:04 AM 8/17/2023     9:56 AM 9/12/2023     1:02 PM   Vitals   Systolic 126 142 126 148 124 142 130   Diastolic 78 76 72 72 64 68 78   Heart Rate  84 84 90 97 90 93   Temp  36.8 °C (98.3 °F) 36.3 °C (97.3 °F)  36.2 °C (97.1 °F)     Resp     16     Height (in)    1.499 m (4' 11\") 1.499 m (4' 11\") 1.492 m (4' 10.75\")    Weight (lb)  144.6 152 153.8 160 160.8 161   BMI  28.48 kg/m2 29.93 kg/m2 31.06 kg/m2 32.32 kg/m2 32.75 kg/m2 32.8 kg/m2   BSA (m2)  1.66 m2 1.7 m2 1.7 m2 1.74 m2 1.74 m2 1.74 m2   Visit Report     Report Report Report         Labs  Lab Results   Component Value Date    BILITOT 0.6 07/07/2023    CALCIUM 9.7 07/07/2023    CO2 28 07/07/2023     07/07/2023    CREATININE 0.71 07/07/2023    GLUCOSE 93 07/07/2023    ALKPHOS 75 " "07/07/2023    K 4.8 07/07/2023    PROT 7.1 07/07/2023     07/07/2023    AST 24 07/07/2023    ALT 20 07/07/2023    BUN 14 07/07/2023    ANIONGAP 14 07/07/2023    ALBUMIN 4.6 07/07/2023    GFRF 86 07/07/2023     Lab Results   Component Value Date    TRIG 167 (H) 07/07/2023    CHOL 190 07/07/2023    HDL 59.6 07/07/2023     No results found for: \"HGBA1C\"    Current Outpatient Medications on File Prior to Visit   Medication Sig Dispense Refill    ezetimibe (Zetia) 10 mg tablet Take 1 tablet (10 mg) by mouth once daily. 30 tablet 11    ibuprofen 600 mg tablet TAKE ONE TABLET BY MOUTH THREE TIMES DAILY WITH FOOD AS NEEDED      Livalo 4 mg tablet Take 25 mg by mouth once daily.      pantoprazole (ProtoNix) 40 mg EC tablet TAKE 1 TABLET (40 MG) BY MOUTH DAILY DO NOT CRUSH, CHEW, OR SPLIT 90 tablet 1     No current facility-administered medications on file prior to visit.        Assessment/Plan   Problem List Items Addressed This Visit       Hypercholesteremia     Other Visit Diagnoses       BMI 32.0-32.9,adult        Statin intolerance            Will continue ezetimibe 10 mg daily -- patient to get BP checked at PCP follow up visit this Saturday -- dont anticipate correlation of elevated BP with ezetimibe as its not a listed side effect in PI  Patient encouraged to eat healthier by adding complex carbs and more fiber into her diet -- discussed in-length during phone call   Continue all other meds as prescribed   Follow up: as needed     Eris Redmond, PharmD         "

## 2023-09-23 ENCOUNTER — PROCEDURE VISIT (OUTPATIENT)
Dept: PRIMARY CARE | Facility: CLINIC | Age: 78
End: 2023-09-23
Payer: MEDICARE

## 2023-09-23 VITALS
BODY MASS INDEX: 32.8 KG/M2 | DIASTOLIC BLOOD PRESSURE: 78 MMHG | SYSTOLIC BLOOD PRESSURE: 138 MMHG | HEART RATE: 88 BPM | WEIGHT: 161 LBS

## 2023-09-23 DIAGNOSIS — L91.8 SKIN TAG: Primary | ICD-10-CM

## 2023-09-23 PROCEDURE — 17110 DESTRUCTION B9 LES UP TO 14: CPT | Performed by: FAMILY MEDICINE

## 2023-09-23 PROCEDURE — 99213 OFFICE O/P EST LOW 20 MIN: CPT | Performed by: FAMILY MEDICINE

## 2023-09-23 ASSESSMENT — ENCOUNTER SYMPTOMS
COLOR CHANGE: 0
ACTIVITY CHANGE: 0
CHOKING: 0
FACIAL ASYMMETRY: 0
CHEST TIGHTNESS: 0
APPETITE CHANGE: 0
ARTHRALGIAS: 0
BACK PAIN: 0
FATIGUE: 0
LIGHT-HEADEDNESS: 0
PALPITATIONS: 0
DIZZINESS: 0
HEADACHES: 0
COUGH: 0
FEVER: 0

## 2023-09-23 NOTE — PROGRESS NOTES
Subjective   Patient ID: Julia Antonio is a 78 y.o. female who presents for Skin tag removal left underarm. .    HPI   Patient reports that she has a skin tag removal that she would like performed under her left underarm.  Patient ID: Julia Antonio is a 78 y.o. female.    Skin Tag Removal    Date/Time: 9/23/2023 9:11 AM    Performed by: Heaven Fletcher DO  Authorized by: Heaven Fletcher DO    Consent:     Consent obtained:  Verbal    Risks, benefits, and alternatives were discussed: yes      Risks discussed:  Bleeding, infection, pain and incomplete drainage    Alternatives discussed:  No treatment  Universal protocol:     Immediately prior to procedure, a time out was called: yes      Patient identity confirmed:  Verbally with patient  Indications:     Indications:  Skin tags under bilateral  Pre-procedure details:     Skin preparation:  Antiseptic wash  Sedation:     Sedation type:  None  Anesthesia:     Anesthesia method:  None  Procedure specific details:      1 skin tag removed under bilateral armpits    Monsel solution was used to stop bleeding which was minimal  Post-procedure details:     Procedure completion:  Tolerated well, no immediate complications    Review of Systems   Constitutional:  Negative for activity change, appetite change, fatigue and fever.   HENT:  Negative for congestion.    Respiratory:  Negative for cough, choking and chest tightness.    Cardiovascular:  Negative for chest pain, palpitations and leg swelling.   Musculoskeletal:  Negative for arthralgias, back pain and gait problem.   Skin:  Negative for color change and pallor.        Skin tag left underarm   Neurological:  Negative for dizziness, facial asymmetry, light-headedness and headaches.       Objective   /70 (BP Location: Left arm)   Pulse 88   Wt 73 kg (161 lb)   BMI 32.80 kg/m²   BSA Body surface area is 1.74 meters squared.      Physical Exam  Constitutional:       Appearance: Normal appearance.   Skin:      General: Skin is warm.      Comments: Skin tag left underarm   Neurological:      Mental Status: She is alert.       Office Visit on 08/17/2023   Component Date Value Ref Range Status    Pathology Report 08/17/2023    Final                    Value:    Accession #: [id number]     Date of Procedure:  8/17/2023       Pathologist: [facility], Cytology  Date Reported: 8/24/2023  Date Received:  8/17/2023  Submitting Physician: NORY TRUONG D.O.                    FINAL CYTOLOGICAL INTERPRETATION        A.  THINPREP PAP CERVICAL:              Specimen Adequacy:       SATISFACTORY FOR EVALUATION.       Quality Indicator: Endocervical/transformation zone component cannot be  assessed because of severe atrophy.              General Categorization:       NEGATIVE FOR INTRAEPITHELIAL LESION OR MALIGNANCY.                            HIGH RISK HPV TEST RESULT:                       HPV GENOTYPE  16                      NEGATIVE       HPV GENOTYPE  18                      NEGATIVE       HPV GENOTYPE  OTHER             NEGATIVE              Reference Range: Negative                  Slide(s) initially screened by a Cytotechnologist at [facility], [address] [place] [address]    Testing for high-risk (HR) type of human papilloma virus (HPV) is performed by  the Roche fabi HPV Test.  The fabi HPV Test is a qualitative polymerase chain  reaction that amplifies DNA of HPV16, HPV18 and 12 other high-risk HPV types  (31, 33, 35, 39, 45, 51, 52, 56, 58, 59, 66, and 68) associated with cervical  cancer and its precursor lesions.  A positive result indicates the presence of  HPV DNA due to one or more of the 14 genotypes: 16, 18, 31, 33, 35, 39, 45, 51,  52, 56, 58, 59, 66, and 68. Negative results indicate HPV DNA concentrations  are undetectable or below the pre-set threshold for detection. False negative  results may be  associated with unoptimized sampling. A negative HR HPV result  does not exclude the possibility of future cytologic HSIL or underlying CIN2-3  or cancer.    This test is approved for cervical specimens by  the [place] Food and Drug  Administration. Results of this test should be interpreted in conjunction with  the patient’s Pap test re                          sults.  Please refer to ASCCP current guidelines for  the use of HPV DNA testing, result interpretation, and patient management.   The performance of this test was verified by the Molecular Diagnostic  Laboratory at [facility]. The lab is  certified under the Clinical Laboratory Amendments of 1988 (CLIA 88) as  qualified to perform high complexity clinical laboratory testing.    This specimen has been analyzed by the [employer] MarsPrep Imaging System ([employer]  an automated imaging and review system, which assists the laboratory in  evaluating cells on ThinPrep Pap tests. Following automated imaging, selected  fields from every slide were reviewed by a cytotechnologist and/or pathologist.    Electronically Signed Out By [facility], Cytology//LSM   By the signature on this report, the individual or group listed as making the  Final Interpretation/Diagnosis certifies that they have reviewed this case.  Diagnostic interpretation perfo                          rmed at [facility] [address] [place] [address]  Educational Note:  Cervical cytology is a screening procedure primarily for squamous cancers and  precursors and has associated false-negative and false-positive results as  evidenced by published data.  Your patient’s test should be interpreted in this  context, together with patient’s history and clinical findings.  Regular  sampling and follow-up of unexplained clinical signs and symptoms are  recommended to minimize false negative results.         Clinical History  Date of Last  Menstrual Period:     Not provided    Other Clinical Conditions:  HPV Test for All Interpretations - Include HPV Genotype    EPIC Order Description: GYNECOLOGIC CYTOLOGY CONSULTATION  Specimen Information: ThinPrep, CERVIX, SCREENING  Clinical Diagnosis History: Z12.4-Screening for cervical cancer  Perform HPV HR test? Always (all interpretations)  Include HPV Genotype? Yes   Source of Specimen  A: THINPREP PAP CERVICAL            [facility]  Department of Pathology   [address] [place] [address]        POC Specific Gravity, Urine 08/17/2023 >=1.030  1.005 - 1.035 Final    POC PH, Urine 08/17/2023 5.5  No Reference Range Established PH Final    POC Protein, Urine 08/17/2023 NEGATIVE  NEGATIVE, 30 (1+) mg/dl Final    POC Glucose, Urine 08/17/2023 NEGATIVE  NEGATIVE mg/dl Final    POC Blood, Urine 08/17/2023 NEGATIVE  NEGATIVE Final    POC Ketones, Urine 08/17/2023 NEGATIVE  NEGATIVE mg/dl Final    POC Bilirubin, Urine 08/17/2023 NEGATIVE  NEGATIVE Final    POC Urobilinogen, Urine 08/17/2023 0.2  0.2, 1.0 EU/DL Final    Poc Nitrate, Urine 08/17/2023 NEGATIVE  NEGATIVE Final    POC Leukocytes, Urine 08/17/2023 NEGATIVE  NEGATIVE Final   Lab on 07/07/2023   Component Date Value Ref Range Status    WBC 07/07/2023 5.4  4.4 - 11.3 x10E9/L Final    nRBC 07/07/2023 0.0  0.0 - 0.0 /100 WBC Final    RBC 07/07/2023 4.43  4.00 - 5.20 x10E12/L Final    Hemoglobin 07/07/2023 13.0  12.0 - 16.0 g/dL Final    Hematocrit 07/07/2023 41.4  36.0 - 46.0 % Final    MCV 07/07/2023 93  80 - 100 fL Final    MCHC 07/07/2023 31.4 (L)  32.0 - 36.0 g/dL Final    Platelets 07/07/2023 218  150 - 450 x10E9/L Final    RDW 07/07/2023 12.8  11.5 - 14.5 % Final    Neutrophils % 07/07/2023 58.8  40.0 - 80.0 % Final    Immature Granulocytes %, Automated 07/07/2023 0.2  0.0 - 0.9 % Final     Immature Granulocyte Count (IG) includes promyelocytes,    myelocytes and metamyelocytes but  does not include bands.   Percent differential counts (%) should be interpreted in the   context of the absolute cell counts (cells/L).    Lymphocytes % 07/07/2023 27.3  13.0 - 44.0 % Final    Monocytes % 07/07/2023 11.6  2.0 - 10.0 % Final    Eosinophils % 07/07/2023 1.7  0.0 - 6.0 % Final    Basophils % 07/07/2023 0.4  0.0 - 2.0 % Final    Neutrophils Absolute 07/07/2023 3.20  1.60 - 5.50 x10E9/L Final    Lymphocytes Absolute 07/07/2023 1.48  0.80 - 3.00 x10E9/L Final    Monocytes Absolute 07/07/2023 0.63  0.05 - 0.80 x10E9/L Final    Eosinophils Absolute 07/07/2023 0.09  0.00 - 0.40 x10E9/L Final    Basophils Absolute 07/07/2023 0.02  0.00 - 0.10 x10E9/L Final    Glucose 07/07/2023 93  74 - 99 mg/dL Final    Sodium 07/07/2023 140  136 - 145 mmol/L Final    Potassium 07/07/2023 4.8  3.5 - 5.3 mmol/L Final    Chloride 07/07/2023 103  98 - 107 mmol/L Final    Bicarbonate 07/07/2023 28  21 - 32 mmol/L Final    Anion Gap 07/07/2023 14  10 - 20 mmol/L Final    Urea Nitrogen 07/07/2023 14  6 - 23 mg/dL Final    Creatinine 07/07/2023 0.71  0.50 - 1.05 mg/dL Final    GFR Female 07/07/2023 86  >90 mL/min/1.73m2 Final     CALCULATIONS OF ESTIMATED GFR ARE PERFORMED   USING THE 2021 CKD-EPI STUDY REFIT EQUATION   WITHOUT THE RACE VARIABLE FOR THE IDMS-TRACEABLE   CREATININE METHODS.    https://jasn.asnjournals.org/content/early/2021/09/22/ASN.1607393488    Calcium 07/07/2023 9.7  8.6 - 10.6 mg/dL Final    Albumin 07/07/2023 4.6  3.4 - 5.0 g/dL Final    Alkaline Phosphatase 07/07/2023 75  33 - 136 U/L Final    Total Protein 07/07/2023 7.1  6.4 - 8.2 g/dL Final    AST 07/07/2023 24  9 - 39 U/L Final    Total Bilirubin 07/07/2023 0.6  0.0 - 1.2 mg/dL Final    ALT (SGPT) 07/07/2023 20  7 - 45 U/L Final     Patients treated with Sulfasalazine may generate    falsely decreased results for ALT.    Cholesterol 07/07/2023 190  0 - 199 mg/dL Final    .      AGE      DESIRABLE   BORDERLINE HIGH   HIGH     0-19 Y     0 - 169       170 -  199     >/= 200    20-24 Y     0 - 189       190 - 224     >/= 225         >24 Y     0 - 199       200 - 239     >/= 240   **All ranges are based on fasting samples. Specific   therapeutic targets will vary based on patient-specific   cardiac risk.  .   Pediatric guidelines reference:Pediatrics 2011, 128(S5).   Adult guidelines reference: NCEP ATPIII Guidelines,     SAURABH 2001, 258:2486-97  .   Venipuncture immediately after or during the    administration of Metamizole may lead to falsely   low results. Testing should be performed immediately   prior to Metamizole dosing.    HDL 07/07/2023 59.6  mg/dL Final    .      AGE      VERY LOW   LOW     NORMAL    HIGH       0-19 Y       < 35   < 40     40-45     ----    20-24 Y       ----   < 40       >45     ----      >24 Y       ----   < 40     40-60      >60  .    Cholesterol/HDL Ratio 07/07/2023 3.2   Final    REF VALUES  DESIRABLE  < 3.4  HIGH RISK  > 5.0    LDL 07/07/2023 97  0 - 99 mg/dL Final    .                           NEAR      BORD      AGE      DESIRABLE  OPTIMAL    HIGH     HIGH     VERY HIGH     0-19 Y     0 - 109     ---    110-129   >/= 130     ----    20-24 Y     0 - 119     ---    120-159   >/= 160     ----      >24 Y     0 -  99   100-129  130-159   160-189     >/=190  .    VLDL 07/07/2023 33  0 - 40 mg/dL Final    Triglycerides 07/07/2023 167 (H)  0 - 149 mg/dL Final    .      AGE      DESIRABLE   BORDERLINE HIGH   HIGH     VERY HIGH   0 D-90 D    19 - 174         ----         ----        ----  91 D- 9 Y     0 -  74        75 -  99     >/= 100      ----    10-19 Y     0 -  89        90 - 129     >/= 130      ----    20-24 Y     0 - 114       115 - 149     >/= 150      ----         >24 Y     0 - 149       150 - 199    200- 499    >/= 500  .   Venipuncture immediately after or during the    administration of Metamizole may lead to falsely   low results. Testing should be performed immediately   prior to Metamizole dosing.    TSH 07/07/2023 2.62  0.44 -  3.98 mIU/L Final     TSH testing is performed using different testing    methodology at [facility] than at other    [facility]. Direct result comparisons should    only be made within the same method.    Vitamin D, 25-Hydroxy 07/07/2023 25 (A)  ng/mL Final    .  DEFICIENCY:         < 20   NG/ML  INSUFFICIENCY:      20-29  NG/ML  SUFFICIENCY:         NG/ML    THIS ASSAY ACCURATELY QUANTIFIES THE SUM OF  VITAMIN D3, 25-HYDROXY AND VIT D2,25-HYDROXY.    Vitamin B-12 07/07/2023 337  211 - 911 pg/mL Final     Current Outpatient Medications on File Prior to Visit   Medication Sig Dispense Refill    ezetimibe (Zetia) 10 mg tablet Take 1 tablet (10 mg) by mouth once daily. 30 tablet 11    ibuprofen 600 mg tablet TAKE ONE TABLET BY MOUTH THREE TIMES DAILY WITH FOOD AS NEEDED      pantoprazole (ProtoNix) 40 mg EC tablet TAKE 1 TABLET (40 MG) BY MOUTH DAILY DO NOT CRUSH, CHEW, OR SPLIT 90 tablet 1    Livalo 4 mg tablet Take 25 mg by mouth once daily.       No current facility-administered medications on file prior to visit.     No images are attached to the encounter.            Assessment/Plan   Diagnoses and all orders for this visit:  Skin tag  Other orders  -     Skin Tag Removal    1.  Patient to keep area clean and dry  2.  Patient to call for questions or concerns

## 2023-11-08 ENCOUNTER — APPOINTMENT (OUTPATIENT)
Dept: PRIMARY CARE | Facility: CLINIC | Age: 78
End: 2023-11-08
Payer: MEDICARE

## 2023-11-17 ENCOUNTER — LAB (OUTPATIENT)
Dept: LAB | Facility: LAB | Age: 78
End: 2023-11-17
Payer: MEDICARE

## 2023-11-17 ENCOUNTER — OFFICE VISIT (OUTPATIENT)
Dept: PRIMARY CARE | Facility: CLINIC | Age: 78
End: 2023-11-17
Payer: MEDICARE

## 2023-11-17 ENCOUNTER — ANCILLARY PROCEDURE (OUTPATIENT)
Dept: RADIOLOGY | Facility: CLINIC | Age: 78
End: 2023-11-17
Payer: MEDICARE

## 2023-11-17 VITALS
BODY MASS INDEX: 33.28 KG/M2 | HEART RATE: 93 BPM | WEIGHT: 163.4 LBS | DIASTOLIC BLOOD PRESSURE: 78 MMHG | SYSTOLIC BLOOD PRESSURE: 124 MMHG

## 2023-11-17 DIAGNOSIS — M11.20 CALCIUM PYROPHOSPHATE DEPOSITION DISEASE (CPPD): ICD-10-CM

## 2023-11-17 DIAGNOSIS — M54.40 ACUTE MIDLINE LOW BACK PAIN WITH SCIATICA, SCIATICA LATERALITY UNSPECIFIED: ICD-10-CM

## 2023-11-17 DIAGNOSIS — E78.00 HYPERCHOLESTEREMIA: ICD-10-CM

## 2023-11-17 DIAGNOSIS — L91.8 SKIN TAG: Primary | ICD-10-CM

## 2023-11-17 PROCEDURE — 1160F RVW MEDS BY RX/DR IN RCRD: CPT | Performed by: FAMILY MEDICINE

## 2023-11-17 PROCEDURE — 1158F ADVNC CARE PLAN TLK DOCD: CPT | Performed by: FAMILY MEDICINE

## 2023-11-17 PROCEDURE — 72220 X-RAY EXAM SACRUM TAILBONE: CPT

## 2023-11-17 PROCEDURE — 1036F TOBACCO NON-USER: CPT | Performed by: FAMILY MEDICINE

## 2023-11-17 PROCEDURE — 36415 COLL VENOUS BLD VENIPUNCTURE: CPT

## 2023-11-17 PROCEDURE — 72170 X-RAY EXAM OF PELVIS: CPT | Performed by: RADIOLOGY

## 2023-11-17 PROCEDURE — 1159F MED LIST DOCD IN RCRD: CPT | Performed by: FAMILY MEDICINE

## 2023-11-17 PROCEDURE — 80053 COMPREHEN METABOLIC PANEL: CPT

## 2023-11-17 PROCEDURE — 80061 LIPID PANEL: CPT

## 2023-11-17 PROCEDURE — 72220 X-RAY EXAM SACRUM TAILBONE: CPT | Performed by: RADIOLOGY

## 2023-11-17 PROCEDURE — 72170 X-RAY EXAM OF PELVIS: CPT

## 2023-11-17 PROCEDURE — 99214 OFFICE O/P EST MOD 30 MIN: CPT | Performed by: FAMILY MEDICINE

## 2023-11-17 RX ORDER — ONDANSETRON 4 MG/1
TABLET, FILM COATED ORAL
COMMUNITY
Start: 2023-11-04

## 2023-11-17 ASSESSMENT — ENCOUNTER SYMPTOMS
COUGH: 0
PALPITATIONS: 0
CHEST TIGHTNESS: 0
FEVER: 0
BACK PAIN: 0
DIZZINESS: 0
COLOR CHANGE: 0
LIGHT-HEADEDNESS: 0
FATIGUE: 0
APPETITE CHANGE: 0
CHOKING: 0
FACIAL ASYMMETRY: 0
ARTHRALGIAS: 0
ACTIVITY CHANGE: 0
HEADACHES: 0

## 2023-11-17 NOTE — PROGRESS NOTES
Subjective   Patient ID: Julia Antonio is a 78 y.o. female who presents for Skin tag removal from left underarm. .    HPI   Reports she wishes to discuss her medication for her cholesterol as well as has a skin tag on her arm.    Patient ID: Julia Antonio is a 78 y.o. female.    Skin Tag Removal    Date/Time: 11/17/2023 12:56 PM    Performed by: Heaven Fletcher DO  Authorized by: Heaven Fletcher DO    Consent:     Consent obtained:  Verbal    Consent given by:  Patient    Risks discussed:  Bleeding, infection and pain    Alternatives discussed:  No treatment  Universal protocol:     Procedure explained and questions answered to patient or proxy's satisfaction: yes      Patient identity confirmed:  Verbally with patient  Indications:     Indications:  Rubbing against her arm and irritating her  Pre-procedure details:     Procedure prep: alcohol.  Sedation:     Sedation type:  None  Anesthesia:     Anesthesia method:  None  Procedure specific details:      Scissors were used to remove the skin tag, Monsels solution was placed at the base of skin tag, bleeding was stopped immediately  Post-procedure details:     Procedure completion:  Tolerated well, no immediate complications    Review of Systems   Constitutional:  Negative for activity change, appetite change, fatigue and fever.   HENT:  Negative for congestion.    Respiratory:  Negative for cough, choking and chest tightness.    Cardiovascular:  Negative for chest pain, palpitations and leg swelling.   Musculoskeletal:  Negative for arthralgias, back pain and gait problem.   Skin:  Negative for color change and pallor.        Skin tag axilla   Neurological:  Negative for dizziness, facial asymmetry, light-headedness and headaches.       Objective   /78 (BP Location: Left arm)   Pulse 93   Wt 74.1 kg (163 lb 6.4 oz)   BMI 33.28 kg/m²   BSA Body surface area is 1.75 meters squared.      Physical Exam  Constitutional:       General: She is not in acute  distress.     Appearance: Normal appearance. She is not toxic-appearing.   HENT:      Head: Normocephalic.   Eyes:      Conjunctiva/sclera: Conjunctivae normal.      Pupils: Pupils are equal, round, and reactive to light.   Cardiovascular:      Rate and Rhythm: Normal rate and regular rhythm.   Musculoskeletal:         General: No swelling or tenderness.   Skin:     Findings: Lesion present. No rash.      Comments: Skin tag axilla left see procedure note   Neurological:      General: No focal deficit present.      Mental Status: She is alert and oriented to person, place, and time. Mental status is at baseline.      Gait: Gait normal.   Psychiatric:         Mood and Affect: Mood normal.         Behavior: Behavior normal.         Thought Content: Thought content normal.         Judgment: Judgment normal.       Office Visit on 08/17/2023   Component Date Value Ref Range Status    Pathology Report 08/17/2023    Final                    Value:    Accession #: [id number]     Date of Procedure:  8/17/2023       Pathologist: [facility], Cytology  Date Reported: 8/24/2023  Date Received:  8/17/2023  Submitting Physician: NORY TRUONG D.O.                    FINAL CYTOLOGICAL INTERPRETATION        A.  THINPREP PAP CERVICAL:              Specimen Adequacy:       SATISFACTORY FOR EVALUATION.       Quality Indicator: Endocervical/transformation zone component cannot be  assessed because of severe atrophy.              General Categorization:       NEGATIVE FOR INTRAEPITHELIAL LESION OR MALIGNANCY.                            HIGH RISK HPV TEST RESULT:                       HPV GENOTYPE  16                      NEGATIVE       HPV GENOTYPE  18                      NEGATIVE       HPV GENOTYPE  OTHER             NEGATIVE              Reference Range: Negative                  Slide(s) initially screened by a Cytotechnologist at [facility], [address] [place] [address],  [place] [address]    Testing for high-risk (HR) type of human papilloma virus (HPV) is performed by  the Roche fabi HPV Test.  The fabi HPV Test is a qualitative polymerase chain  reaction that amplifies DNA of HPV16, HPV18 and 12 other high-risk HPV types  (31, 33, 35, 39, 45, 51, 52, 56, 58, 59, 66, and 68) associated with cervical  cancer and its precursor lesions.  A positive result indicates the presence of  HPV DNA due to one or more of the 14 genotypes: 16, 18, 31, 33, 35, 39, 45, 51,  52, 56, 58, 59, 66, and 68. Negative results indicate HPV DNA concentrations  are undetectable or below the pre-set threshold for detection. False negative  results may be associated with unoptimized sampling. A negative HR HPV result  does not exclude the possibility of future cytologic HSIL or underlying CIN2-3  or cancer.    This test is approved for cervical specimens by  the US Food and Drug  Administration. Results of this test should be interpreted in conjunction with  the patient’s Pap test re                          sults.  Please refer to ASCCP current guidelines for  the use of HPV DNA testing, result interpretation, and patient management.   The performance of this test was verified by the Molecular Diagnostic  Laboratory at [facility]. The lab is  certified under the Clinical Laboratory Amendments of 1988 (CLIA 88) as  qualified to perform high complexity clinical laboratory testing.    This specimen has been analyzed by the ThinPrep Imaging System ([employer]  an automated imaging and review system, which assists the laboratory in  evaluating cells on ThinPrep Pap tests. Following automated imaging, selected  fields from every slide were reviewed by a cytotechnologist and/or pathologist.    Electronically Signed Out By [facility], Cytology//LSM   By the signature on this report, the individual or group listed as making  the  Final Interpretation/Diagnosis certifies that they have reviewed this case.  Diagnostic interpretation perfo                          rmed at [facility] [address] [place] [address]  Educational Note:  Cervical cytology is a screening procedure primarily for squamous cancers and  precursors and has associated false-negative and false-positive results as  evidenced by published data.  Your patient’s test should be interpreted in this  context, together with patient’s history and clinical findings.  Regular  sampling and follow-up of unexplained clinical signs and symptoms are  recommended to minimize false negative results.         Clinical History  Date of Last Menstrual Period:     Not provided    Other Clinical Conditions:  HPV Test for All Interpretations - Include HPV Genotype    EPIC Order Description: GYNECOLOGIC CYTOLOGY CONSULTATION  Specimen Information: ThinPrep, CERVIX, SCREENING  Clinical Diagnosis History: Z12.4-Screening for cervical cancer  Perform HPV HR test? Always (all interpretations)  Include HPV Genotype? Yes   Source of Specimen  A: THINPREP PAP CERVICAL            [facility]  Department of Pathology   [address] [place] [address]        POC Specific Gravity, Urine 08/17/2023 >=1.030  1.005 - 1.035 Final    POC PH, Urine 08/17/2023 5.5  No Reference Range Established PH Final    POC Protein, Urine 08/17/2023 NEGATIVE  NEGATIVE, 30 (1+) mg/dl Final    POC Glucose, Urine 08/17/2023 NEGATIVE  NEGATIVE mg/dl Final    POC Blood, Urine 08/17/2023 NEGATIVE  NEGATIVE Final    POC Ketones, Urine 08/17/2023 NEGATIVE  NEGATIVE mg/dl Final    POC Bilirubin, Urine 08/17/2023 NEGATIVE  NEGATIVE Final    POC Urobilinogen, Urine 08/17/2023 0.2  0.2, 1.0 EU/DL Final    Poc Nitrite, Urine 08/17/2023 NEGATIVE  NEGATIVE Final    POC Leukocytes, Urine 08/17/2023 NEGATIVE  NEGATIVE Final   Lab on 07/07/2023   Component  Date Value Ref Range Status    WBC 07/07/2023 5.4  4.4 - 11.3 x10E9/L Final    nRBC 07/07/2023 0.0  0.0 - 0.0 /100 WBC Final    RBC 07/07/2023 4.43  4.00 - 5.20 x10E12/L Final    Hemoglobin 07/07/2023 13.0  12.0 - 16.0 g/dL Final    Hematocrit 07/07/2023 41.4  36.0 - 46.0 % Final    MCV 07/07/2023 93  80 - 100 fL Final    MCHC 07/07/2023 31.4 (L)  32.0 - 36.0 g/dL Final    Platelets 07/07/2023 218  150 - 450 x10E9/L Final    RDW 07/07/2023 12.8  11.5 - 14.5 % Final    Neutrophils % 07/07/2023 58.8  40.0 - 80.0 % Final    Immature Granulocytes %, Automated 07/07/2023 0.2  0.0 - 0.9 % Final     Immature Granulocyte Count (IG) includes promyelocytes,    myelocytes and metamyelocytes but does not include bands.   Percent differential counts (%) should be interpreted in the   context of the absolute cell counts (cells/L).    Lymphocytes % 07/07/2023 27.3  13.0 - 44.0 % Final    Monocytes % 07/07/2023 11.6  2.0 - 10.0 % Final    Eosinophils % 07/07/2023 1.7  0.0 - 6.0 % Final    Basophils % 07/07/2023 0.4  0.0 - 2.0 % Final    Neutrophils Absolute 07/07/2023 3.20  1.60 - 5.50 x10E9/L Final    Lymphocytes Absolute 07/07/2023 1.48  0.80 - 3.00 x10E9/L Final    Monocytes Absolute 07/07/2023 0.63  0.05 - 0.80 x10E9/L Final    Eosinophils Absolute 07/07/2023 0.09  0.00 - 0.40 x10E9/L Final    Basophils Absolute 07/07/2023 0.02  0.00 - 0.10 x10E9/L Final    Glucose 07/07/2023 93  74 - 99 mg/dL Final    Sodium 07/07/2023 140  136 - 145 mmol/L Final    Potassium 07/07/2023 4.8  3.5 - 5.3 mmol/L Final    Chloride 07/07/2023 103  98 - 107 mmol/L Final    Bicarbonate 07/07/2023 28  21 - 32 mmol/L Final    Anion Gap 07/07/2023 14  10 - 20 mmol/L Final    Urea Nitrogen 07/07/2023 14  6 - 23 mg/dL Final    Creatinine 07/07/2023 0.71  0.50 - 1.05 mg/dL Final    GFR Female 07/07/2023 86  >90 mL/min/1.73m2 Final     CALCULATIONS OF ESTIMATED GFR ARE PERFORMED   USING THE 2021 CKD-EPI STUDY REFIT EQUATION   WITHOUT THE RACE VARIABLE FOR  THE IDMS-TRACEABLE   CREATININE METHODS.    https://jasn.asnjournals.org/content/early/2021/09/22/ASN.2109919789    Calcium 07/07/2023 9.7  8.6 - 10.6 mg/dL Final    Albumin 07/07/2023 4.6  3.4 - 5.0 g/dL Final    Alkaline Phosphatase 07/07/2023 75  33 - 136 U/L Final    Total Protein 07/07/2023 7.1  6.4 - 8.2 g/dL Final    AST 07/07/2023 24  9 - 39 U/L Final    Total Bilirubin 07/07/2023 0.6  0.0 - 1.2 mg/dL Final    ALT (SGPT) 07/07/2023 20  7 - 45 U/L Final     Patients treated with Sulfasalazine may generate    falsely decreased results for ALT.    Cholesterol 07/07/2023 190  0 - 199 mg/dL Final    .      AGE      DESIRABLE   BORDERLINE HIGH   HIGH     0-19 Y     0 - 169       170 - 199     >/= 200    20-24 Y     0 - 189       190 - 224     >/= 225         >24 Y     0 - 199       200 - 239     >/= 240   **All ranges are based on fasting samples. Specific   therapeutic targets will vary based on patient-specific   cardiac risk.  .   Pediatric guidelines reference:Pediatrics 2011, 128(S5).   Adult guidelines reference: NCEP ATPIII Guidelines,     SAURABH 2001, 258:2486-97  .   Venipuncture immediately after or during the    administration of Metamizole may lead to falsely   low results. Testing should be performed immediately   prior to Metamizole dosing.    HDL 07/07/2023 59.6  mg/dL Final    .      AGE      VERY LOW   LOW     NORMAL    HIGH       0-19 Y       < 35   < 40     40-45     ----    20-24 Y       ----   < 40       >45     ----      >24 Y       ----   < 40     40-60      >60  .    Cholesterol/HDL Ratio 07/07/2023 3.2   Final    REF VALUES  DESIRABLE  < 3.4  HIGH RISK  > 5.0    LDL 07/07/2023 97  0 - 99 mg/dL Final    .                           NEAR      BORD      AGE      DESIRABLE  OPTIMAL    HIGH     HIGH     VERY HIGH     0-19 Y     0 - 109     ---    110-129   >/= 130     ----    20-24 Y     0 - 119     ---    120-159   >/= 160     ----      >24 Y     0 -  99   100-129  130-159   160-189     >/=190  .     VLDL 07/07/2023 33  0 - 40 mg/dL Final    Triglycerides 07/07/2023 167 (H)  0 - 149 mg/dL Final    .      AGE      DESIRABLE   BORDERLINE HIGH   HIGH     VERY HIGH   0 D-90 D    19 - 174         ----         ----        ----  91 D- 9 Y     0 -  74        75 -  99     >/= 100      ----    10-19 Y     0 -  89        90 - 129     >/= 130      ----    20-24 Y     0 - 114       115 - 149     >/= 150      ----         >24 Y     0 - 149       150 - 199    200- 499    >/= 500  .   Venipuncture immediately after or during the    administration of Metamizole may lead to falsely   low results. Testing should be performed immediately   prior to Metamizole dosing.    TSH 07/07/2023 2.62  0.44 - 3.98 mIU/L Final     TSH testing is performed using different testing    methodology at [facility] than at other    [facility]. Direct result comparisons should    only be made within the same method.    Vitamin D, 25-Hydroxy 07/07/2023 25 (A)  ng/mL Final    .  DEFICIENCY:         < 20   NG/ML  INSUFFICIENCY:      20-29  NG/ML  SUFFICIENCY:         NG/ML    THIS ASSAY ACCURATELY QUANTIFIES THE SUM OF  VITAMIN D3, 25-HYDROXY AND VIT D2,25-HYDROXY.    Vitamin B-12 07/07/2023 337  211 - 911 pg/mL Final     Current Outpatient Medications on File Prior to Visit   Medication Sig Dispense Refill    ezetimibe (Zetia) 10 mg tablet Take 1 tablet (10 mg) by mouth once daily. 30 tablet 11    ibuprofen 600 mg tablet TAKE ONE TABLET BY MOUTH THREE TIMES DAILY WITH FOOD AS NEEDED      ondansetron (Zofran) 4 mg tablet       pantoprazole (ProtoNix) 40 mg EC tablet TAKE 1 TABLET (40 MG) BY MOUTH DAILY DO NOT CRUSH, CHEW, OR SPLIT 90 tablet 1    [DISCONTINUED] Livalo 4 mg tablet Take 25 mg by mouth once daily.       No current facility-administered medications on file prior to visit.     No images are attached to the encounter.            Assessment/Plan   Diagnoses and all orders for this visit:  Skin tag  -     Skin Tag  Removal  Hypercholesteremia  -     Lipid Panel; Future  -     Comprehensive Metabolic Panel; Future  1.  Patient to keep area clean and dry  2.  Patient have x-ray of pelvis and sacrum  3.  Patient to have additional labs performed for her Zetia  4.  Patient to call for questions or concerns

## 2023-11-18 LAB
ALBUMIN SERPL BCP-MCNC: 4.5 G/DL (ref 3.4–5)
ALP SERPL-CCNC: 69 U/L (ref 33–136)
ALT SERPL W P-5'-P-CCNC: 19 U/L (ref 7–45)
ANION GAP SERPL CALC-SCNC: 14 MMOL/L (ref 10–20)
AST SERPL W P-5'-P-CCNC: 23 U/L (ref 9–39)
BILIRUB SERPL-MCNC: 0.6 MG/DL (ref 0–1.2)
BUN SERPL-MCNC: 14 MG/DL (ref 6–23)
CALCIUM SERPL-MCNC: 9.8 MG/DL (ref 8.6–10.6)
CHLORIDE SERPL-SCNC: 100 MMOL/L (ref 98–107)
CHOLEST SERPL-MCNC: 235 MG/DL (ref 0–199)
CHOLESTEROL/HDL RATIO: 3.8
CO2 SERPL-SCNC: 29 MMOL/L (ref 21–32)
CREAT SERPL-MCNC: 0.69 MG/DL (ref 0.5–1.05)
GFR SERPL CREATININE-BSD FRML MDRD: 88 ML/MIN/1.73M*2
GLUCOSE SERPL-MCNC: 83 MG/DL (ref 74–99)
HDLC SERPL-MCNC: 61.4 MG/DL
LDLC SERPL CALC-MCNC: 139 MG/DL
NON HDL CHOLESTEROL: 174 MG/DL (ref 0–149)
POTASSIUM SERPL-SCNC: 4.2 MMOL/L (ref 3.5–5.3)
PROT SERPL-MCNC: 7.3 G/DL (ref 6.4–8.2)
SODIUM SERPL-SCNC: 139 MMOL/L (ref 136–145)
TRIGL SERPL-MCNC: 175 MG/DL (ref 0–149)
VLDL: 35 MG/DL (ref 0–40)

## 2023-11-22 DIAGNOSIS — E78.00 HYPERCHOLESTEREMIA: ICD-10-CM

## 2023-11-22 RX ORDER — PITAVASTATIN CALCIUM 4.18 MG/1
0.5 TABLET, FILM COATED ORAL DAILY
Qty: 30 TABLET | Refills: 1 | Status: SHIPPED | OUTPATIENT
Start: 2023-11-22 | End: 2024-03-15

## 2024-03-06 ENCOUNTER — TELEPHONE (OUTPATIENT)
Dept: PRIMARY CARE | Facility: CLINIC | Age: 79
End: 2024-03-06
Payer: MEDICARE

## 2024-03-06 NOTE — TELEPHONE ENCOUNTER
Please schedule patient for [facility] wellness visit in Aug 2024. Schedule with Dr. Fletcher Please send this encounter to Dr. Fletcher for lab orders once scheduled.     Thank you-  Randal Gomez CMA  3/6/2024  Practice Supervisor  [facility]

## 2024-03-15 DIAGNOSIS — E53.8 VITAMIN B 12 DEFICIENCY: ICD-10-CM

## 2024-03-15 DIAGNOSIS — I34.0 MITRAL VALVE INSUFFICIENCY, UNSPECIFIED ETIOLOGY: ICD-10-CM

## 2024-03-15 DIAGNOSIS — E55.9 VITAMIN D DEFICIENCY: ICD-10-CM

## 2024-03-15 DIAGNOSIS — Z00.00 ENCOUNTER FOR ANNUAL GENERAL MEDICAL EXAMINATION WITHOUT ABNORMAL FINDINGS IN ADULT: ICD-10-CM

## 2024-03-15 DIAGNOSIS — E78.00 HYPERCHOLESTEREMIA: ICD-10-CM

## 2024-03-15 DIAGNOSIS — R42 LIGHTHEADEDNESS: ICD-10-CM

## 2024-03-15 DIAGNOSIS — R53.83 OTHER FATIGUE: ICD-10-CM

## 2024-03-15 DIAGNOSIS — M54.2 NECK PAIN: ICD-10-CM

## 2024-03-15 RX ORDER — PITAVASTATIN CALCIUM 4.18 MG/1
2 TABLET, FILM COATED ORAL DAILY
Qty: 30 TABLET | Refills: 0 | Status: SHIPPED | OUTPATIENT
Start: 2024-03-15

## 2024-08-07 DIAGNOSIS — E78.00 HYPERCHOLESTEREMIA: ICD-10-CM

## 2024-08-14 RX ORDER — PITAVASTATIN CALCIUM 4.18 MG/1
4 TABLET, FILM COATED ORAL DAILY
Qty: 30 TABLET | Refills: 0 | Status: SHIPPED | OUTPATIENT
Start: 2024-08-14

## 2024-08-19 ENCOUNTER — LAB (OUTPATIENT)
Dept: LAB | Facility: LAB | Age: 79
End: 2024-08-19
Payer: MEDICARE

## 2024-08-19 DIAGNOSIS — R53.83 OTHER FATIGUE: ICD-10-CM

## 2024-08-19 DIAGNOSIS — R42 LIGHTHEADEDNESS: ICD-10-CM

## 2024-08-19 DIAGNOSIS — E53.8 VITAMIN B 12 DEFICIENCY: ICD-10-CM

## 2024-08-19 DIAGNOSIS — E55.9 VITAMIN D DEFICIENCY: ICD-10-CM

## 2024-08-19 DIAGNOSIS — Z00.00 ENCOUNTER FOR ANNUAL GENERAL MEDICAL EXAMINATION WITHOUT ABNORMAL FINDINGS IN ADULT: ICD-10-CM

## 2024-08-19 LAB
25(OH)D3 SERPL-MCNC: 36 NG/ML (ref 30–100)
ALBUMIN SERPL BCP-MCNC: 4 G/DL (ref 3.4–5)
ALP SERPL-CCNC: 83 U/L (ref 33–136)
ALT SERPL W P-5'-P-CCNC: 17 U/L (ref 7–45)
ANION GAP SERPL CALC-SCNC: 12 MMOL/L (ref 10–20)
AST SERPL W P-5'-P-CCNC: 18 U/L (ref 9–39)
BASOPHILS # BLD AUTO: 0.03 X10*3/UL (ref 0–0.1)
BASOPHILS NFR BLD AUTO: 0.7 %
BILIRUB SERPL-MCNC: 0.4 MG/DL (ref 0–1.2)
BUN SERPL-MCNC: 15 MG/DL (ref 6–23)
CALCIUM SERPL-MCNC: 9.2 MG/DL (ref 8.6–10.6)
CHLORIDE SERPL-SCNC: 104 MMOL/L (ref 98–107)
CHOLEST SERPL-MCNC: 187 MG/DL (ref 0–199)
CHOLESTEROL/HDL RATIO: 3.5
CO2 SERPL-SCNC: 28 MMOL/L (ref 21–32)
CREAT SERPL-MCNC: 0.7 MG/DL (ref 0.5–1.05)
EGFRCR SERPLBLD CKD-EPI 2021: 87 ML/MIN/1.73M*2
EOSINOPHIL # BLD AUTO: 0.11 X10*3/UL (ref 0–0.4)
EOSINOPHIL NFR BLD AUTO: 2.5 %
ERYTHROCYTE [DISTWIDTH] IN BLOOD BY AUTOMATED COUNT: 12.7 % (ref 11.5–14.5)
GLUCOSE SERPL-MCNC: 97 MG/DL (ref 74–99)
HCT VFR BLD AUTO: 38.6 % (ref 36–46)
HDLC SERPL-MCNC: 53.9 MG/DL
HGB BLD-MCNC: 12.2 G/DL (ref 12–16)
IMM GRANULOCYTES # BLD AUTO: 0.02 X10*3/UL (ref 0–0.5)
IMM GRANULOCYTES NFR BLD AUTO: 0.4 % (ref 0–0.9)
LDLC SERPL CALC-MCNC: 108 MG/DL
LYMPHOCYTES # BLD AUTO: 1.15 X10*3/UL (ref 0.8–3)
LYMPHOCYTES NFR BLD AUTO: 25.7 %
MCH RBC QN AUTO: 29 PG (ref 26–34)
MCHC RBC AUTO-ENTMCNC: 31.6 G/DL (ref 32–36)
MCV RBC AUTO: 92 FL (ref 80–100)
MONOCYTES # BLD AUTO: 0.51 X10*3/UL (ref 0.05–0.8)
MONOCYTES NFR BLD AUTO: 11.4 %
NEUTROPHILS # BLD AUTO: 2.66 X10*3/UL (ref 1.6–5.5)
NEUTROPHILS NFR BLD AUTO: 59.3 %
NON HDL CHOLESTEROL: 133 MG/DL (ref 0–149)
NRBC BLD-RTO: 0 /100 WBCS (ref 0–0)
PLATELET # BLD AUTO: 218 X10*3/UL (ref 150–450)
POTASSIUM SERPL-SCNC: 4.4 MMOL/L (ref 3.5–5.3)
PROT SERPL-MCNC: 6.5 G/DL (ref 6.4–8.2)
RBC # BLD AUTO: 4.2 X10*6/UL (ref 4–5.2)
SODIUM SERPL-SCNC: 140 MMOL/L (ref 136–145)
TRIGL SERPL-MCNC: 124 MG/DL (ref 0–149)
TSH SERPL-ACNC: 2.41 MIU/L (ref 0.44–3.98)
VIT B12 SERPL-MCNC: 322 PG/ML (ref 211–911)
VLDL: 25 MG/DL (ref 0–40)
WBC # BLD AUTO: 4.5 X10*3/UL (ref 4.4–11.3)

## 2024-08-19 PROCEDURE — 82607 VITAMIN B-12: CPT

## 2024-08-19 PROCEDURE — 36415 COLL VENOUS BLD VENIPUNCTURE: CPT

## 2024-08-19 PROCEDURE — 80053 COMPREHEN METABOLIC PANEL: CPT

## 2024-08-19 PROCEDURE — 85025 COMPLETE CBC W/AUTO DIFF WBC: CPT

## 2024-08-19 PROCEDURE — 84443 ASSAY THYROID STIM HORMONE: CPT

## 2024-08-19 PROCEDURE — 80061 LIPID PANEL: CPT

## 2024-08-19 PROCEDURE — 82306 VITAMIN D 25 HYDROXY: CPT

## 2024-08-22 DIAGNOSIS — D51.9 ANEMIA DUE TO VITAMIN B12 DEFICIENCY, UNSPECIFIED B12 DEFICIENCY TYPE: ICD-10-CM

## 2024-08-23 ENCOUNTER — LAB (OUTPATIENT)
Dept: LAB | Facility: LAB | Age: 79
End: 2024-08-23
Payer: MEDICARE

## 2024-08-23 DIAGNOSIS — D51.9 ANEMIA DUE TO VITAMIN B12 DEFICIENCY, UNSPECIFIED B12 DEFICIENCY TYPE: ICD-10-CM

## 2024-08-23 PROCEDURE — 36415 COLL VENOUS BLD VENIPUNCTURE: CPT

## 2024-08-23 PROCEDURE — 83921 ORGANIC ACID SINGLE QUANT: CPT

## 2024-08-26 ENCOUNTER — TELEPHONE (OUTPATIENT)
Dept: PRIMARY CARE | Facility: CLINIC | Age: 79
End: 2024-08-26

## 2024-08-26 ENCOUNTER — APPOINTMENT (OUTPATIENT)
Dept: PRIMARY CARE | Facility: CLINIC | Age: 79
End: 2024-08-26
Payer: MEDICARE

## 2024-08-26 VITALS
HEIGHT: 59 IN | SYSTOLIC BLOOD PRESSURE: 122 MMHG | BODY MASS INDEX: 33.83 KG/M2 | WEIGHT: 167.8 LBS | DIASTOLIC BLOOD PRESSURE: 68 MMHG | HEART RATE: 89 BPM

## 2024-08-26 DIAGNOSIS — E78.00 HYPERCHOLESTEROLEMIA: Primary | ICD-10-CM

## 2024-08-26 DIAGNOSIS — Z71.89 ADVANCE DIRECTIVE DISCUSSED WITH PATIENT: ICD-10-CM

## 2024-08-26 DIAGNOSIS — I34.0 MITRAL VALVE INSUFFICIENCY, UNSPECIFIED ETIOLOGY: ICD-10-CM

## 2024-08-26 DIAGNOSIS — I35.9 AORTIC VALVE DISORDER: ICD-10-CM

## 2024-08-26 DIAGNOSIS — E78.00 HYPERCHOLESTEREMIA: ICD-10-CM

## 2024-08-26 DIAGNOSIS — G45.9 TIA (TRANSIENT ISCHEMIC ATTACK): ICD-10-CM

## 2024-08-26 DIAGNOSIS — Z00.00 ROUTINE GENERAL MEDICAL EXAMINATION AT HEALTH CARE FACILITY: ICD-10-CM

## 2024-08-26 DIAGNOSIS — Z12.31 ENCOUNTER FOR SCREENING MAMMOGRAM FOR BREAST CANCER: ICD-10-CM

## 2024-08-26 DIAGNOSIS — Z00.00 HEALTHCARE MAINTENANCE: ICD-10-CM

## 2024-08-26 DIAGNOSIS — Z96.652 STATUS POST TOTAL LEFT KNEE REPLACEMENT: ICD-10-CM

## 2024-08-26 DIAGNOSIS — B35.6 JOCK ITCH: ICD-10-CM

## 2024-08-26 PROBLEM — B07.0 PLANTAR WARTS: Status: RESOLVED | Noted: 2023-08-17 | Resolved: 2024-08-26

## 2024-08-26 PROBLEM — R42 LIGHTHEADEDNESS: Status: RESOLVED | Noted: 2023-08-17 | Resolved: 2024-08-26

## 2024-08-26 PROBLEM — S61.411A LACERATION OF HAND, RIGHT: Status: RESOLVED | Noted: 2023-08-17 | Resolved: 2024-08-26

## 2024-08-26 PROBLEM — B35.3 TINEA PEDIS OF BOTH FEET: Status: RESOLVED | Noted: 2023-08-17 | Resolved: 2024-08-26

## 2024-08-26 PROBLEM — H61.20 CERUMEN IMPACTION: Status: RESOLVED | Noted: 2023-08-17 | Resolved: 2024-08-26

## 2024-08-26 PROBLEM — B37.0 ORAL THRUSH: Status: RESOLVED | Noted: 2023-08-17 | Resolved: 2024-08-26

## 2024-08-26 PROBLEM — S13.4XXA WHIPLASH: Status: RESOLVED | Noted: 2023-08-17 | Resolved: 2024-08-26

## 2024-08-26 PROBLEM — M25.562 LEFT KNEE PAIN: Status: RESOLVED | Noted: 2023-08-17 | Resolved: 2024-08-26

## 2024-08-26 PROBLEM — R23.9 SKIN CHANGE: Status: RESOLVED | Noted: 2023-08-17 | Resolved: 2024-08-26

## 2024-08-26 PROBLEM — B02.9 SHINGLES: Status: RESOLVED | Noted: 2023-08-17 | Resolved: 2024-08-26

## 2024-08-26 PROBLEM — M17.11 OSTEOARTHRITIS OF RIGHT KNEE: Status: ACTIVE | Noted: 2021-05-05

## 2024-08-26 PROBLEM — B35.4 RINGWORM, BODY: Status: RESOLVED | Noted: 2023-08-17 | Resolved: 2024-08-26

## 2024-08-26 PROBLEM — M89.8X9 BONE PAIN: Status: RESOLVED | Noted: 2023-08-17 | Resolved: 2024-08-26

## 2024-08-26 PROBLEM — H60.91 RIGHT OTITIS EXTERNA: Status: RESOLVED | Noted: 2023-08-17 | Resolved: 2024-08-26

## 2024-08-26 PROBLEM — R60.9 EDEMA: Status: RESOLVED | Noted: 2023-08-17 | Resolved: 2024-08-26

## 2024-08-26 PROCEDURE — 1036F TOBACCO NON-USER: CPT | Performed by: FAMILY MEDICINE

## 2024-08-26 PROCEDURE — G0439 PPPS, SUBSEQ VISIT: HCPCS | Performed by: FAMILY MEDICINE

## 2024-08-26 PROCEDURE — 1123F ACP DISCUSS/DSCN MKR DOCD: CPT | Performed by: FAMILY MEDICINE

## 2024-08-26 PROCEDURE — 99497 ADVNCD CARE PLAN 30 MIN: CPT | Performed by: FAMILY MEDICINE

## 2024-08-26 PROCEDURE — 99214 OFFICE O/P EST MOD 30 MIN: CPT | Performed by: FAMILY MEDICINE

## 2024-08-26 PROCEDURE — 1160F RVW MEDS BY RX/DR IN RCRD: CPT | Performed by: FAMILY MEDICINE

## 2024-08-26 PROCEDURE — 1159F MED LIST DOCD IN RCRD: CPT | Performed by: FAMILY MEDICINE

## 2024-08-26 PROCEDURE — 1170F FXNL STATUS ASSESSED: CPT | Performed by: FAMILY MEDICINE

## 2024-08-26 RX ORDER — ATORVASTATIN CALCIUM 20 MG/1
TABLET, FILM COATED ORAL
Refills: 0 | OUTPATIENT
Start: 2024-08-26

## 2024-08-26 RX ORDER — PITAVASTATIN CALCIUM 4.18 MG/1
4 TABLET, FILM COATED ORAL DAILY
Qty: 30 TABLET | Refills: 11 | Status: SHIPPED | OUTPATIENT
Start: 2024-08-26

## 2024-08-26 RX ORDER — ESOMEPRAZOLE MAGNESIUM 40 MG/1
CAPSULE, DELAYED RELEASE ORAL
COMMUNITY
Start: 2024-01-23

## 2024-08-26 RX ORDER — NYSTATIN 100000 [USP'U]/G
1 POWDER TOPICAL 2 TIMES DAILY
Qty: 30 G | Refills: 1 | Status: SHIPPED | OUTPATIENT
Start: 2024-08-26 | End: 2025-08-26

## 2024-08-26 ASSESSMENT — ACTIVITIES OF DAILY LIVING (ADL)
GROCERY_SHOPPING: INDEPENDENT
DRESSING: INDEPENDENT
TAKING_MEDICATION: INDEPENDENT
DOING_HOUSEWORK: INDEPENDENT
MANAGING_FINANCES: INDEPENDENT
BATHING: INDEPENDENT

## 2024-08-26 ASSESSMENT — ENCOUNTER SYMPTOMS
OCCASIONAL FEELINGS OF UNSTEADINESS: 0
FEVER: 0
LOSS OF SENSATION IN FEET: 0
FACIAL ASYMMETRY: 0
ACTIVITY CHANGE: 0
BACK PAIN: 0
CHOKING: 0
HEADACHES: 0
ARTHRALGIAS: 0
PALPITATIONS: 0
DIZZINESS: 0
FATIGUE: 0
DEPRESSION: 0
APPETITE CHANGE: 0
CHEST TIGHTNESS: 0
COLOR CHANGE: 0
COUGH: 0
LIGHT-HEADEDNESS: 0

## 2024-08-26 ASSESSMENT — PATIENT HEALTH QUESTIONNAIRE - PHQ9
2. FEELING DOWN, DEPRESSED OR HOPELESS: NOT AT ALL
SUM OF ALL RESPONSES TO PHQ9 QUESTIONS 1 AND 2: 0
1. LITTLE INTEREST OR PLEASURE IN DOING THINGS: NOT AT ALL
10. IF YOU CHECKED OFF ANY PROBLEMS, HOW DIFFICULT HAVE THESE PROBLEMS MADE IT FOR YOU TO DO YOUR WORK, TAKE CARE OF THINGS AT HOME, OR GET ALONG WITH OTHER PEOPLE: NOT DIFFICULT AT ALL

## 2024-08-26 NOTE — ACP (ADVANCE CARE PLANNING)
Confirming Previous Code Status:   Advance Care Planning Note     Discussion Date: 08/26/24   Discussion Participants: patient    The patient wishes to discuss Advance Care Planning today and the following is a brief summary of our discussion.     Patient has capacity to make their own medical decisions: Yes  Health Care Agent/Surrogate Decision Maker documented in chart: Yes    Documents on file and valid:  Advance Directive/Living Will: No   Health Care Power of : No  Other:     Communication of Medical Status/Prognosis:   stable    Communication of Treatment Goals/Options:   stable    Treatment Decisions  Goals of Care: survival is prioritized, if goals for quality or survival can reasonably be achieved     Follow Up Plan  stable  Team Members  stable  Time Statement: Total face to face time spent on advance care planning was 5 minutes with 16 minutes spent in counseling, including the explanation.    Heaven Fletcher DO  8/26/2024 11:22 AM

## 2024-08-26 NOTE — TELEPHONE ENCOUNTER
PITAVASTATIN has been APPROVED from 1/1/2024 to 8/26/2025.    Randal Gomez CMA  8/26/2024  Practice Supervisor  [facility]

## 2024-08-26 NOTE — PROGRESS NOTES
"Subjective   Reason for Visit: Julia Antonio is an 79 y.o. female here for a Medicare Wellness visit.     Past Medical, Surgical, and Family History reviewed and updated in chart.    Reviewed all medications by prescribing practitioner or clinical pharmacist (such as prescriptions, OTCs, herbal therapies and supplements) and documented in the medical record.    HPI  Patient presents for physical exam.     Fam Hx  Mom ()  Dad ()     Exercise walks, soccer once a week  ETOH denies  Caffeine 3 cups of coffee/day  Tobacco 2-3 cig/day     OB/GYN Dr. Wilkinson (retired)     Mammogram due 2024  DEXA 2023 osteopenia due 2028  Colonoscopy 7-2024 Dr. Stevens due 2027     Patient denies other complaints.   Patient Self Assessment of Health Status  Patient Self Assessment: Excellent    Nutrition and Exercise  Current Diet: Well Balanced Diet  Adequate Fluid Intake: No  Caffeine: Yes  Exercise Frequency: No Exercise    Functional Ability/Level of Safety  Cognitive Impairment Observed: No cognitive impairment observed  Cognitive Impairment Reported: No cognitive impairment reported by patient or family    Home Safety Risk Factors: None    Patient Care Team:  Heaven Fletcher DO as PCP - General  Heaven Fletcher DO as PCP - MSSP ACO Attributed Provider  Lucho Stevens DO as Referring Physician (Gastroenterology)     Review of Systems   Constitutional:  Negative for activity change, appetite change, fatigue and fever.   HENT:  Negative for congestion.    Respiratory:  Negative for cough, choking and chest tightness.    Cardiovascular:  Negative for chest pain, palpitations and leg swelling.   Musculoskeletal:  Negative for arthralgias, back pain and gait problem.   Skin:  Negative for color change and pallor.   Neurological:  Negative for dizziness, facial asymmetry, light-headedness and headaches.       Objective   Vitals:  /68 (BP Location: Left arm, Patient Position: Sitting)   Pulse 89   Ht 1.486 m (4' 10.5\")  "  Wt 76.1 kg (167 lb 12.8 oz)   BMI 34.47 kg/m²       Physical Exam  Constitutional:       General: She is not in acute distress.     Appearance: Normal appearance. She is not toxic-appearing.   HENT:      Head: Normocephalic.      Right Ear: Tympanic membrane, ear canal and external ear normal.      Left Ear: Tympanic membrane, ear canal and external ear normal.      Nose: Nose normal.      Mouth/Throat:      Pharynx: Oropharynx is clear.   Eyes:      Conjunctiva/sclera: Conjunctivae normal.      Pupils: Pupils are equal, round, and reactive to light.   Cardiovascular:      Rate and Rhythm: Normal rate and regular rhythm.      Pulses: Normal pulses.      Heart sounds: Normal heart sounds.   Pulmonary:      Effort: No respiratory distress.      Breath sounds: No wheezing, rhonchi or rales.   Abdominal:      General: Bowel sounds are normal. There is no distension.      Palpations: Abdomen is soft.      Tenderness: There is no abdominal tenderness.   Musculoskeletal:         General: No swelling or tenderness.      Cervical back: No tenderness.   Skin:     Findings: No lesion or rash.   Neurological:      General: No focal deficit present.      Mental Status: She is alert and oriented to person, place, and time. Mental status is at baseline.      Gait: Gait normal.   Psychiatric:         Mood and Affect: Mood normal.         Behavior: Behavior normal.         Thought Content: Thought content normal.         Judgment: Judgment normal.         Assessment/Plan   Problem List Items Addressed This Visit       Hypercholesterolemia - Primary    Relevant Medications    pitavastatin calcium 4 mg tablet    Aortic valve disorder    Mitral valve regurgitation    TIA (transient ischemic attack)    Status post total left knee replacement     Other Visit Diagnoses       Healthcare maintenance        Encounter for screening mammogram for breast cancer        Relevant Orders    BI mammo bilateral screening tomosynthesis    Routine  general medical examination at health care facility        Relevant Orders    1 Year Follow Up In [facility] - PCP - Wellness Exam    Hypercholesteremia        Relevant Medications    pitavastatin calcium 4 mg tablet    Jock itch        Advance directive discussed with patient [Z71.89]                1.  Patient's blood work discussed at this office visit  2.  Patient's LDL goal <100, current   3.  Patient's triglyceride goal less than 150, current triglycerides 124, continue on TYPE IV diet  4.  Patient's vitamin D level low start vitamin D3 2000 units daily  5.  Mammogram due 2024  6.  DEXA 2023 osteopenia due 2028  7.  Colonoscopy 7-2024 Dr. Stevens due 2027  8.  Patinet to see dietician   9.  Patient to call if questions or concerns

## 2024-08-30 DIAGNOSIS — B35.3 TINEA PEDIS OF BOTH FEET: ICD-10-CM

## 2024-08-30 LAB — METHYLMALONATE SERPL-SCNC: 0.17 UMOL/L (ref 0–0.4)

## 2024-08-30 RX ORDER — CLOTRIMAZOLE AND BETAMETHASONE DIPROPIONATE 10; .64 MG/G; MG/G
1 CREAM TOPICAL 2 TIMES DAILY
Qty: 15 G | Refills: 0 | Status: SHIPPED | OUTPATIENT
Start: 2024-08-30 | End: 2024-10-29

## 2024-09-12 ENCOUNTER — HOSPITAL ENCOUNTER (OUTPATIENT)
Dept: RADIOLOGY | Facility: CLINIC | Age: 79
Discharge: HOME | End: 2024-09-12
Payer: MEDICARE

## 2024-09-12 VITALS — HEIGHT: 59 IN | WEIGHT: 167 LBS | BODY MASS INDEX: 33.67 KG/M2

## 2024-09-12 DIAGNOSIS — Z12.31 ENCOUNTER FOR SCREENING MAMMOGRAM FOR BREAST CANCER: ICD-10-CM

## 2024-09-12 PROCEDURE — 77067 SCR MAMMO BI INCL CAD: CPT

## 2024-09-14 ENCOUNTER — OFFICE VISIT (OUTPATIENT)
Dept: PRIMARY CARE | Facility: CLINIC | Age: 79
End: 2024-09-14
Payer: MEDICARE

## 2024-09-14 VITALS — WEIGHT: 165.8 LBS | TEMPERATURE: 97.8 F | BODY MASS INDEX: 34.06 KG/M2 | HEART RATE: 96 BPM

## 2024-09-14 DIAGNOSIS — J01.10 ACUTE NON-RECURRENT FRONTAL SINUSITIS: Primary | ICD-10-CM

## 2024-09-14 PROCEDURE — 1036F TOBACCO NON-USER: CPT | Performed by: FAMILY MEDICINE

## 2024-09-14 PROCEDURE — 1159F MED LIST DOCD IN RCRD: CPT | Performed by: FAMILY MEDICINE

## 2024-09-14 PROCEDURE — 1123F ACP DISCUSS/DSCN MKR DOCD: CPT | Performed by: FAMILY MEDICINE

## 2024-09-14 PROCEDURE — 99214 OFFICE O/P EST MOD 30 MIN: CPT | Performed by: FAMILY MEDICINE

## 2024-09-14 RX ORDER — DOXYCYCLINE 100 MG/1
100 CAPSULE ORAL 2 TIMES DAILY
Qty: 20 CAPSULE | Refills: 0 | Status: SHIPPED | OUTPATIENT
Start: 2024-09-14 | End: 2024-09-24

## 2024-09-14 ASSESSMENT — ENCOUNTER SYMPTOMS
FEVER: 0
ARTHRALGIAS: 0
EYE DISCHARGE: 0
CHOKING: 0
LIGHT-HEADEDNESS: 0
EYE ITCHING: 0
FATIGUE: 0
COUGH: 0
HEADACHES: 0
PALPITATIONS: 0
FACIAL ASYMMETRY: 0
COLOR CHANGE: 0
CHEST TIGHTNESS: 0
DIZZINESS: 0
ACTIVITY CHANGE: 0
APPETITE CHANGE: 0
BACK PAIN: 0
SORE THROAT: 1

## 2024-09-14 NOTE — PROGRESS NOTES
Subjective   Patient ID: Julia Antonio is a 79 y.o. female who presents for Sore Throat (X Thursday. ).    HPI   Patient reports she has had difficulty with sore throat eyes are watering since Thursday.  Denies any nausea vomiting or constipation denies any chest pain shortness of breath syncopal episodes or palpitations  Review of Systems   Constitutional:  Negative for activity change, appetite change, fatigue and fever.   HENT:  Positive for sore throat. Negative for congestion.    Eyes:  Negative for discharge and itching.        Eyes are watering   Respiratory:  Negative for cough, choking and chest tightness.    Cardiovascular:  Negative for chest pain, palpitations and leg swelling.   Musculoskeletal:  Negative for arthralgias, back pain and gait problem.   Skin:  Negative for color change and pallor.   Neurological:  Negative for dizziness, facial asymmetry, light-headedness and headaches.       Objective   Pulse 96   Temp 36.6 °C (97.8 °F)   Wt 75.2 kg (165 lb 12.8 oz)   BMI 34.06 kg/m²   BSA Body surface area is 1.76 meters squared.      Physical Exam  Constitutional:       General: She is not in acute distress.     Appearance: Normal appearance. She is not toxic-appearing.   HENT:      Head: Normocephalic.      Right Ear: Tympanic membrane, ear canal and external ear normal.      Left Ear: Tympanic membrane, ear canal and external ear normal.   Eyes:      Conjunctiva/sclera: Conjunctivae normal.      Pupils: Pupils are equal, round, and reactive to light.   Cardiovascular:      Rate and Rhythm: Normal rate and regular rhythm.      Pulses: Normal pulses.      Heart sounds: Normal heart sounds.   Pulmonary:      Effort: No respiratory distress.      Breath sounds: No wheezing, rhonchi or rales.   Musculoskeletal:         General: No swelling or tenderness.   Skin:     Findings: No lesion or rash.   Neurological:      General: No focal deficit present.      Mental Status: She is alert and oriented to  person, place, and time. Mental status is at baseline.      Gait: Gait normal.   Psychiatric:         Mood and Affect: Mood normal.         Behavior: Behavior normal.         Thought Content: Thought content normal.         Judgment: Judgment normal.       Lab on 08/23/2024   Component Date Value Ref Range Status    Methylmalonic Acid, S 08/23/2024 0.17  0.00 - 0.40 umol/L Final    INTERPRETIVE INFORMATION: MMA Serum/Plasma,                             Vitamin B12 Status    This test was developed and its performance characteristics   determined by [facility]. It has not been cleared or   approved by the US Food and Drug Administration. This test was   performed in a CLIA certified laboratory and is intended for   clinical purposes.  Performed By: [facility]  [address] [place] [id number]  : Carlos Myrick MD, PhD  CLIA Number: [id number]   Lab on 08/19/2024   Component Date Value Ref Range Status    WBC 08/19/2024 4.5  4.4 - 11.3 x10*3/uL Final    nRBC 08/19/2024 0.0  0.0 - 0.0 /100 WBCs Final    RBC 08/19/2024 4.20  4.00 - 5.20 x10*6/uL Final    Hemoglobin 08/19/2024 12.2  12.0 - 16.0 g/dL Final    Hematocrit 08/19/2024 38.6  36.0 - 46.0 % Final    MCV 08/19/2024 92  80 - 100 fL Final    MCH 08/19/2024 29.0  26.0 - 34.0 pg Final    MCHC 08/19/2024 31.6 (L)  32.0 - 36.0 g/dL Final    RDW 08/19/2024 12.7  11.5 - 14.5 % Final    Platelets 08/19/2024 218  150 - 450 x10*3/uL Final    Neutrophils % 08/19/2024 59.3  40.0 - 80.0 % Final    Immature Granulocytes %, Automated 08/19/2024 0.4  0.0 - 0.9 % Final    Immature Granulocyte Count (IG) includes promyelocytes, myelocytes and metamyelocytes but does not include bands. Percent differential counts (%) should be interpreted in the context of the absolute cell counts (cells/UL).    Lymphocytes % 08/19/2024 25.7  13.0 - 44.0 % Final    Monocytes % 08/19/2024 11.4  2.0 - 10.0 % Final    Eosinophils % 08/19/2024 2.5  0.0 -  6.0 % Final    Basophils % 08/19/2024 0.7  0.0 - 2.0 % Final    Neutrophils Absolute 08/19/2024 2.66  1.60 - 5.50 x10*3/uL Final    Percent differential counts (%) should be interpreted in the context of the absolute cell counts (cells/uL).    Immature Granulocytes Absolute, Au* 08/19/2024 0.02  0.00 - 0.50 x10*3/uL Final    Lymphocytes Absolute 08/19/2024 1.15  0.80 - 3.00 x10*3/uL Final    Monocytes Absolute 08/19/2024 0.51  0.05 - 0.80 x10*3/uL Final    Eosinophils Absolute 08/19/2024 0.11  0.00 - 0.40 x10*3/uL Final    Basophils Absolute 08/19/2024 0.03  0.00 - 0.10 x10*3/uL Final    Glucose 08/19/2024 97  74 - 99 mg/dL Final    Sodium 08/19/2024 140  136 - 145 mmol/L Final    Potassium 08/19/2024 4.4  3.5 - 5.3 mmol/L Final    Chloride 08/19/2024 104  98 - 107 mmol/L Final    Bicarbonate 08/19/2024 28  21 - 32 mmol/L Final    Anion Gap 08/19/2024 12  10 - 20 mmol/L Final    Urea Nitrogen 08/19/2024 15  6 - 23 mg/dL Final    Creatinine 08/19/2024 0.70  0.50 - 1.05 mg/dL Final    eGFR 08/19/2024 87  >60 mL/min/1.73m*2 Final    Calculations of estimated GFR are performed using the 2021 CKD-EPI Study Refit equation without the race variable for the IDMS-Traceable creatinine methods.  https://jasn.asnjournals.org/content/early/2021/09/22/ASN.3073282882    Calcium 08/19/2024 9.2  8.6 - 10.6 mg/dL Final    Albumin 08/19/2024 4.0  3.4 - 5.0 g/dL Final    Alkaline Phosphatase 08/19/2024 83  33 - 136 U/L Final    Total Protein 08/19/2024 6.5  6.4 - 8.2 g/dL Final    AST 08/19/2024 18  9 - 39 U/L Final    Bilirubin, Total 08/19/2024 0.4  0.0 - 1.2 mg/dL Final    ALT 08/19/2024 17  7 - 45 U/L Final    Patients treated with Sulfasalazine may generate falsely decreased results for ALT.    Cholesterol 08/19/2024 187  0 - 199 mg/dL Final          Age      Desirable   Borderline High   High     0-19 Y     0 - 169       170 - 199     >/= 200    20-24 Y     0 - 189       190 - 224     >/= 225         >24 Y     0 - 199       200 -  239     >/= 240   **All ranges are based on fasting samples. Specific   therapeutic targets will vary based on patient-specific   cardiac risk.    Pediatric guidelines reference:Pediatrics 2011, 128(S5).Adult guidelines reference: NCEP ATPIII Guidelines,SAURABH 2001, 258:2486-97    Venipuncture immediately after or during the administration of Metamizole may lead to falsely low results. Testing should be performed immediately prior to Metamizole dosing.    HDL-Cholesterol 08/19/2024 53.9  mg/dL Final      Age       Very Low   Low     Normal    High    0-19 Y    < 35      < 40     40-45     ----  20-24 Y    ----     < 40      >45      ----        >24 Y      ----     < 40     40-60      >60      Cholesterol/HDL Ratio 08/19/2024 3.5   Final      Ref Values  Desirable  < 3.4  High Risk  > 5.0    LDL Calculated 08/19/2024 108 (H)  <=99 mg/dL Final                                Near   Borderline      AGE      Desirable  Optimal    High     High     Very High     0-19 Y     0 - 109     ---    110-129   >/= 130     ----    20-24 Y     0 - 119     ---    120-159   >/= 160     ----      >24 Y     0 -  99   100-129  130-159   160-189     >/=190      VLDL 08/19/2024 25  0 - 40 mg/dL Final    Triglycerides 08/19/2024 124  0 - 149 mg/dL Final       Age         Desirable   Borderline High   High     Very High   0 D-90 D    19 - 174         ----         ----        ----  91 D- 9 Y     0 -  74        75 -  99     >/= 100      ----    10-19 Y     0 -  89        90 - 129     >/= 130      ----    20-24 Y     0 - 114       115 - 149     >/= 150      ----         >24 Y     0 - 149       150 - 199    200- 499    >/= 500    Venipuncture immediately after or during the administration of Metamizole may lead to falsely low results. Testing should be performed immediately prior to Metamizole dosing.    Non HDL Cholesterol 08/19/2024 133  0 - 149 mg/dL Final          Age       Desirable   Borderline High   High     Very High     0-19 Y     0 -  119       120 - 144     >/= 145    >/= 160    20-24 Y     0 - 149       150 - 189     >/= 190      ----         >24 Y    30 mg/dL above LDL Cholesterol goal      Vitamin B12 08/19/2024 322  211 - 911 pg/mL Final    Vitamin D, 25-Hydroxy, Total 08/19/2024 36  30 - 100 ng/mL Final    Thyroid Stimulating Hormone 08/19/2024 2.41  0.44 - 3.98 mIU/L Final   Lab on 11/17/2023   Component Date Value Ref Range Status    Cholesterol 11/17/2023 235 (H)  0 - 199 mg/dL Final          Age      Desirable   Borderline High   High     0-19 Y     0 - 169       170 - 199     >/= 200    20-24 Y     0 - 189       190 - 224     >/= 225         >24 Y     0 - 199       200 - 239     >/= 240   **All ranges are based on fasting samples. Specific   therapeutic targets will vary based on patient-specific   cardiac risk.    Pediatric guidelines reference:Pediatrics 2011, 128(S5).Adult guidelines reference: NCEP ATPIII Guidelines,SAURABH 2001, 258:2486-97    Venipuncture immediately after or during the administration of Metamizole may lead to falsely low results. Testing should be performed immediately prior to Metamizole dosing.    HDL-Cholesterol 11/17/2023 61.4  mg/dL Final      Age       Very Low   Low     Normal    High    0-19 Y    < 35      < 40     40-45     ----  20-24 Y    ----     < 40      >45      ----        >24 Y      ----     < 40     40-60      >60      Cholesterol/HDL Ratio 11/17/2023 3.8   Final      Ref Values  Desirable  < 3.4  High Risk  > 5.0    LDL Calculated 11/17/2023 139 (H)  <=99 mg/dL Final                                Near   Borderline      AGE      Desirable  Optimal    High     High     Very High     0-19 Y     0 - 109     ---    110-129   >/= 130     ----    20-24 Y     0 - 119     ---    120-159   >/= 160     ----      >24 Y     0 -  99   100-129  130-159   160-189     >/=190      VLDL 11/17/2023 35  0 - 40 mg/dL Final    Triglycerides 11/17/2023 175 (H)  0 - 149 mg/dL Final       Age         Desirable    Borderline High   High     Very High   0 D-90 D    19 - 174         ----         ----        ----  91 D- 9 Y     0 -  74        75 -  99     >/= 100      ----    10-19 Y     0 -  89        90 - 129     >/= 130      ----    20-24 Y     0 - 114       115 - 149     >/= 150      ----         >24 Y     0 - 149       150 - 199    200- 499    >/= 500    Venipuncture immediately after or during the administration of Metamizole may lead to falsely low results. Testing should be performed immediately prior to Metamizole dosing.    Non HDL Cholesterol 11/17/2023 174 (H)  0 - 149 mg/dL Final          Age       Desirable   Borderline High   High     Very High     0-19 Y     0 - 119       120 - 144     >/= 145    >/= 160    20-24 Y     0 - 149       150 - 189     >/= 190      ----         >24 Y    30 mg/dL above LDL Cholesterol goal      Glucose 11/17/2023 83  74 - 99 mg/dL Final    Sodium 11/17/2023 139  136 - 145 mmol/L Final    Potassium 11/17/2023 4.2  3.5 - 5.3 mmol/L Final    Chloride 11/17/2023 100  98 - 107 mmol/L Final    Bicarbonate 11/17/2023 29  21 - 32 mmol/L Final    Anion Gap 11/17/2023 14  10 - 20 mmol/L Final    Urea Nitrogen 11/17/2023 14  6 - 23 mg/dL Final    Creatinine 11/17/2023 0.69  0.50 - 1.05 mg/dL Final    eGFR 11/17/2023 88  >60 mL/min/1.73m*2 Final    Calculations of estimated GFR are performed using the 2021 CKD-EPI Study Refit equation without the race variable for the IDMS-Traceable creatinine methods.  https://jasn.asnjournals.org/content/early/2021/09/22/ASN.1678102742    Calcium 11/17/2023 9.8  8.6 - 10.6 mg/dL Final    Albumin 11/17/2023 4.5  3.4 - 5.0 g/dL Final    Alkaline Phosphatase 11/17/2023 69  33 - 136 U/L Final    Total Protein 11/17/2023 7.3  6.4 - 8.2 g/dL Final    AST 11/17/2023 23  9 - 39 U/L Final    Bilirubin, Total 11/17/2023 0.6  0.0 - 1.2 mg/dL Final    ALT 11/17/2023 19  7 - 45 U/L Final    Patients treated with Sulfasalazine may generate falsely decreased results for ALT.      Current Outpatient Medications on File Prior to Visit   Medication Sig Dispense Refill    clotrimazole-betamethasone (Lotrisone) cream Apply 1 Application topically 2 times a day. 15 g 0    esomeprazole (NexIUM) 40 mg DR capsule TAKE 1 CAPSULE BY MOUTH EVERY DAY AS DIRECTED NOT COVERED      ibuprofen 600 mg tablet TAKE ONE TABLET BY MOUTH THREE TIMES DAILY WITH FOOD AS NEEDED      nystatin (Mycostatin) 100,000 unit/gram powder Apply 1 Application topically 2 times a day. 30 g 1    pitavastatin calcium 4 mg tablet Take 4 mg by mouth once daily. 30 tablet 11     No current facility-administered medications on file prior to visit.     No images are attached to the encounter.            Assessment/Plan   Diagnoses and all orders for this visit:  Acute non-recurrent frontal sinusitis    Patient to start on antibiotics  Patient to call if questions or concerns

## 2024-09-26 ENCOUNTER — HOSPITAL ENCOUNTER (OUTPATIENT)
Dept: RADIOLOGY | Facility: EXTERNAL LOCATION | Age: 79
Discharge: HOME | End: 2024-09-26

## 2024-10-29 ENCOUNTER — TELEPHONE (OUTPATIENT)
Dept: PRIMARY CARE | Facility: CLINIC | Age: 79
End: 2024-10-29
Payer: MEDICARE

## 2024-10-29 DIAGNOSIS — E78.00 HYPERCHOLESTEREMIA: ICD-10-CM

## 2024-10-29 RX ORDER — PITAVASTATIN CALCIUM 4.18 MG/1
4 TABLET, FILM COATED ORAL DAILY
Qty: 30 TABLET | Refills: 11 | Status: SHIPPED | OUTPATIENT
Start: 2024-10-29

## 2024-12-28 ENCOUNTER — OFFICE VISIT (OUTPATIENT)
Dept: PRIMARY CARE | Facility: CLINIC | Age: 79
End: 2024-12-28
Payer: MEDICARE

## 2024-12-28 VITALS
DIASTOLIC BLOOD PRESSURE: 70 MMHG | TEMPERATURE: 98 F | HEART RATE: 98 BPM | SYSTOLIC BLOOD PRESSURE: 126 MMHG | WEIGHT: 168 LBS | BODY MASS INDEX: 34.51 KG/M2

## 2024-12-28 DIAGNOSIS — H65.02 ACUTE SEROUS OTITIS MEDIA OF LEFT EAR, RECURRENCE NOT SPECIFIED: Primary | ICD-10-CM

## 2024-12-28 PROCEDURE — 1036F TOBACCO NON-USER: CPT | Performed by: FAMILY MEDICINE

## 2024-12-28 PROCEDURE — 1123F ACP DISCUSS/DSCN MKR DOCD: CPT | Performed by: FAMILY MEDICINE

## 2024-12-28 PROCEDURE — 99214 OFFICE O/P EST MOD 30 MIN: CPT | Performed by: FAMILY MEDICINE

## 2024-12-28 RX ORDER — DOXYCYCLINE 100 MG/1
100 CAPSULE ORAL 2 TIMES DAILY
Qty: 20 CAPSULE | Refills: 0 | Status: SHIPPED | OUTPATIENT
Start: 2024-12-28 | End: 2025-01-07

## 2024-12-28 ASSESSMENT — ENCOUNTER SYMPTOMS
CHEST TIGHTNESS: 0
FACIAL ASYMMETRY: 0
APPETITE CHANGE: 0
CHOKING: 0
FATIGUE: 0
COLOR CHANGE: 0
PALPITATIONS: 0
ACTIVITY CHANGE: 0
LIGHT-HEADEDNESS: 0
ARTHRALGIAS: 0
DIZZINESS: 0
COUGH: 0
FEVER: 0
HEADACHES: 0
BACK PAIN: 0

## 2024-12-28 NOTE — PROGRESS NOTES
Subjective   Patient ID: Julia Antonio is a 79 y.o. female who presents for Left ear pain (X yesterday. ).    HPI   Patient reports she has had difficulty with earache.  Denies any nausea vomiting diarrhea constipation denies any fever chills.    Review of Systems   Constitutional:  Negative for activity change, appetite change, fatigue and fever.   HENT:  Positive for ear pain. Negative for congestion.    Respiratory:  Negative for cough, choking and chest tightness.    Cardiovascular:  Negative for chest pain, palpitations and leg swelling.   Musculoskeletal:  Negative for arthralgias, back pain and gait problem.   Skin:  Negative for color change and pallor.   Neurological:  Negative for dizziness, facial asymmetry, light-headedness and headaches.       Objective   /70 (BP Location: Right arm, Patient Position: Sitting)   Pulse 98   Temp 36.7 °C (98 °F)   Wt 76.2 kg (168 lb)   BMI 34.51 kg/m²   BSA Body surface area is 1.77 meters squared.      Physical Exam  Constitutional:       General: She is not in acute distress.     Appearance: Normal appearance. She is not toxic-appearing.   HENT:      Head: Normocephalic.      Right Ear: Tympanic membrane, ear canal and external ear normal.      Ears:      Comments: erythema and bulging to left tympanic membrane small chunk of wax on the side as well  Eyes:      Conjunctiva/sclera: Conjunctivae normal.      Pupils: Pupils are equal, round, and reactive to light.   Cardiovascular:      Rate and Rhythm: Normal rate and regular rhythm.      Pulses: Normal pulses.      Heart sounds: Normal heart sounds.   Pulmonary:      Effort: No respiratory distress.      Breath sounds: No wheezing, rhonchi or rales.   Musculoskeletal:         General: No swelling or tenderness.   Skin:     Findings: No lesion or rash.   Neurological:      General: No focal deficit present.      Mental Status: She is alert and oriented to person, place, and time. Mental status is at  baseline.      Gait: Gait normal.   Psychiatric:         Mood and Affect: Mood normal.         Behavior: Behavior normal.         Thought Content: Thought content normal.         Judgment: Judgment normal.       Lab on 08/23/2024   Component Date Value Ref Range Status    Methylmalonic Acid, S 08/23/2024 0.17  0.00 - 0.40 umol/L Final    INTERPRETIVE INFORMATION: MMA Serum/Plasma,                             Vitamin B12 Status    This test was developed and its performance characteristics   determined by [facility]. It has not been cleared or   approved by the US Food and Drug Administration. This test was   performed in a CLIA certified laboratory and is intended for   clinical purposes.  Performed By: [facility]  [address] [place] [id number]  : Carlos Myrick MD, PhD  CLIA Number: [id number]   Lab on 08/19/2024   Component Date Value Ref Range Status    WBC 08/19/2024 4.5  4.4 - 11.3 x10*3/uL Final    nRBC 08/19/2024 0.0  0.0 - 0.0 /100 WBCs Final    RBC 08/19/2024 4.20  4.00 - 5.20 x10*6/uL Final    Hemoglobin 08/19/2024 12.2  12.0 - 16.0 g/dL Final    Hematocrit 08/19/2024 38.6  36.0 - 46.0 % Final    MCV 08/19/2024 92  80 - 100 fL Final    MCH 08/19/2024 29.0  26.0 - 34.0 pg Final    MCHC 08/19/2024 31.6 (L)  32.0 - 36.0 g/dL Final    RDW 08/19/2024 12.7  11.5 - 14.5 % Final    Platelets 08/19/2024 218  150 - 450 x10*3/uL Final    Neutrophils % 08/19/2024 59.3  40.0 - 80.0 % Final    Immature Granulocytes %, Automated 08/19/2024 0.4  0.0 - 0.9 % Final    Immature Granulocyte Count (IG) includes promyelocytes, myelocytes and metamyelocytes but does not include bands. Percent differential counts (%) should be interpreted in the context of the absolute cell counts (cells/UL).    Lymphocytes % 08/19/2024 25.7  13.0 - 44.0 % Final    Monocytes % 08/19/2024 11.4  2.0 - 10.0 % Final    Eosinophils % 08/19/2024 2.5  0.0 - 6.0 % Final    Basophils % 08/19/2024 0.7  0.0  - 2.0 % Final    Neutrophils Absolute 08/19/2024 2.66  1.60 - 5.50 x10*3/uL Final    Percent differential counts (%) should be interpreted in the context of the absolute cell counts (cells/uL).    Immature Granulocytes Absolute, Au* 08/19/2024 0.02  0.00 - 0.50 x10*3/uL Final    Lymphocytes Absolute 08/19/2024 1.15  0.80 - 3.00 x10*3/uL Final    Monocytes Absolute 08/19/2024 0.51  0.05 - 0.80 x10*3/uL Final    Eosinophils Absolute 08/19/2024 0.11  0.00 - 0.40 x10*3/uL Final    Basophils Absolute 08/19/2024 0.03  0.00 - 0.10 x10*3/uL Final    Glucose 08/19/2024 97  74 - 99 mg/dL Final    Sodium 08/19/2024 140  136 - 145 mmol/L Final    Potassium 08/19/2024 4.4  3.5 - 5.3 mmol/L Final    Chloride 08/19/2024 104  98 - 107 mmol/L Final    Bicarbonate 08/19/2024 28  21 - 32 mmol/L Final    Anion Gap 08/19/2024 12  10 - 20 mmol/L Final    Urea Nitrogen 08/19/2024 15  6 - 23 mg/dL Final    Creatinine 08/19/2024 0.70  0.50 - 1.05 mg/dL Final    eGFR 08/19/2024 87  >60 mL/min/1.73m*2 Final    Calculations of estimated GFR are performed using the 2021 CKD-EPI Study Refit equation without the race variable for the IDMS-Traceable creatinine methods.  https://jasn.asnjournals.org/content/early/2021/09/22/ASN.7273486827    Calcium 08/19/2024 9.2  8.6 - 10.6 mg/dL Final    Albumin 08/19/2024 4.0  3.4 - 5.0 g/dL Final    Alkaline Phosphatase 08/19/2024 83  33 - 136 U/L Final    Total Protein 08/19/2024 6.5  6.4 - 8.2 g/dL Final    AST 08/19/2024 18  9 - 39 U/L Final    Bilirubin, Total 08/19/2024 0.4  0.0 - 1.2 mg/dL Final    ALT 08/19/2024 17  7 - 45 U/L Final    Patients treated with Sulfasalazine may generate falsely decreased results for ALT.    Cholesterol 08/19/2024 187  0 - 199 mg/dL Final          Age      Desirable   Borderline High   High     0-19 Y     0 - 169       170 - 199     >/= 200    20-24 Y     0 - 189       190 - 224     >/= 225         >24 Y     0 - 199       200 - 239     >/= 240   **All ranges are based on  fasting samples. Specific   therapeutic targets will vary based on patient-specific   cardiac risk.    Pediatric guidelines reference:Pediatrics 2011, 128(S5).Adult guidelines reference: NCEP ATPIII Guidelines,SAURABH 2001, 258:2486-97    Venipuncture immediately after or during the administration of Metamizole may lead to falsely low results. Testing should be performed immediately prior to Metamizole dosing.    HDL-Cholesterol 08/19/2024 53.9  mg/dL Final      Age       Very Low   Low     Normal    High    0-19 Y    < 35      < 40     40-45     ----  20-24 Y    ----     < 40      >45      ----        >24 Y      ----     < 40     40-60      >60      Cholesterol/HDL Ratio 08/19/2024 3.5   Final      Ref Values  Desirable  < 3.4  High Risk  > 5.0    LDL Calculated 08/19/2024 108 (H)  <=99 mg/dL Final                                Near   Borderline      AGE      Desirable  Optimal    High     High     Very High     0-19 Y     0 - 109     ---    110-129   >/= 130     ----    20-24 Y     0 - 119     ---    120-159   >/= 160     ----      >24 Y     0 -  99   100-129  130-159   160-189     >/=190      VLDL 08/19/2024 25  0 - 40 mg/dL Final    Triglycerides 08/19/2024 124  0 - 149 mg/dL Final       Age         Desirable   Borderline High   High     Very High   0 D-90 D    19 - 174         ----         ----        ----  91 D- 9 Y     0 -  74        75 -  99     >/= 100      ----    10-19 Y     0 -  89        90 - 129     >/= 130      ----    20-24 Y     0 - 114       115 - 149     >/= 150      ----         >24 Y     0 - 149       150 - 199    200- 499    >/= 500    Venipuncture immediately after or during the administration of Metamizole may lead to falsely low results. Testing should be performed immediately prior to Metamizole dosing.    Non HDL Cholesterol 08/19/2024 133  0 - 149 mg/dL Final          Age       Desirable   Borderline High   High     Very High     0-19 Y     0 - 119       120 - 144     >/= 145    >/= 160     20-24 Y     0 - 149       150 - 189     >/= 190      ----         >24 Y    30 mg/dL above LDL Cholesterol goal      Vitamin B12 08/19/2024 322  211 - 911 pg/mL Final    Vitamin D, 25-Hydroxy, Total 08/19/2024 36  30 - 100 ng/mL Final    Thyroid Stimulating Hormone 08/19/2024 2.41  0.44 - 3.98 mIU/L Final     Current Outpatient Medications on File Prior to Visit   Medication Sig Dispense Refill    esomeprazole (NexIUM) 40 mg DR capsule TAKE 1 CAPSULE BY MOUTH EVERY DAY AS DIRECTED NOT COVERED      nystatin (Mycostatin) 100,000 unit/gram powder Apply 1 Application topically 2 times a day. 30 g 1    pitavastatin calcium 4 mg tablet Take 4 mg by mouth once daily. 30 tablet 11    [DISCONTINUED] ibuprofen 600 mg tablet TAKE ONE TABLET BY MOUTH THREE TIMES DAILY WITH FOOD AS NEEDED (Patient not taking: Reported on 12/28/2024)       No current facility-administered medications on file prior to visit.     No images are attached to the encounter.            Assessment/Plan   Diagnoses and all orders for this visit:  Acute serous otitis media of left ear, recurrence not specified  -     doxycycline (Vibramycin) 100 mg capsule; Take 1 capsule (100 mg) by mouth 2 times a day for 10 days. Take with at least 8 ounces (large glass) of water, do not lie down for 30 minutes after    1.  Patient to start on antibiotics  2.  Patient to call for questions or concerns

## 2025-01-14 ENCOUNTER — APPOINTMENT (OUTPATIENT)
Dept: PRIMARY CARE | Facility: CLINIC | Age: 80
End: 2025-01-14
Payer: MEDICARE

## 2025-01-14 VITALS
TEMPERATURE: 97.9 F | DIASTOLIC BLOOD PRESSURE: 78 MMHG | WEIGHT: 168 LBS | SYSTOLIC BLOOD PRESSURE: 136 MMHG | HEART RATE: 92 BPM | BODY MASS INDEX: 34.51 KG/M2

## 2025-01-14 DIAGNOSIS — E78.00 HYPERCHOLESTEROLEMIA: ICD-10-CM

## 2025-01-14 DIAGNOSIS — H65.02 ACUTE SEROUS OTITIS MEDIA OF LEFT EAR, RECURRENCE NOT SPECIFIED: Primary | ICD-10-CM

## 2025-01-14 PROCEDURE — 1036F TOBACCO NON-USER: CPT | Performed by: FAMILY MEDICINE

## 2025-01-14 PROCEDURE — 99214 OFFICE O/P EST MOD 30 MIN: CPT | Performed by: FAMILY MEDICINE

## 2025-01-14 PROCEDURE — 1123F ACP DISCUSS/DSCN MKR DOCD: CPT | Performed by: FAMILY MEDICINE

## 2025-01-14 PROCEDURE — 1159F MED LIST DOCD IN RCRD: CPT | Performed by: FAMILY MEDICINE

## 2025-01-14 RX ORDER — MUPIROCIN 20 MG/G
OINTMENT TOPICAL 3 TIMES DAILY
Qty: 22 G | Refills: 0 | Status: SHIPPED | OUTPATIENT
Start: 2025-01-14 | End: 2025-01-15 | Stop reason: SDUPTHER

## 2025-01-14 ASSESSMENT — ENCOUNTER SYMPTOMS
FEVER: 0
PALPITATIONS: 0
LIGHT-HEADEDNESS: 0
COUGH: 0
CHOKING: 0
ARTHRALGIAS: 0
FATIGUE: 0
HEADACHES: 0
COLOR CHANGE: 0
CHEST TIGHTNESS: 0
ACTIVITY CHANGE: 0
FACIAL ASYMMETRY: 0
DIZZINESS: 0
APPETITE CHANGE: 0
BACK PAIN: 0

## 2025-01-14 NOTE — PROGRESS NOTES
Subjective   Patient ID: Julia Antonio is a 79 y.o. female who presents for Follow-up (Left ear ).    HPI   Patient reports she has had difficulty with earache.  Denies any nausea vomiting diarrhea constipation denies any fever chills.    Review of Systems   Constitutional:  Negative for activity change, appetite change, fatigue and fever.   HENT:  Positive for ear pain. Negative for congestion.    Respiratory:  Negative for cough, choking and chest tightness.    Cardiovascular:  Negative for chest pain, palpitations and leg swelling.   Musculoskeletal:  Negative for arthralgias, back pain and gait problem.   Skin:  Negative for color change and pallor.   Neurological:  Negative for dizziness, facial asymmetry, light-headedness and headaches.       Objective   /78 (BP Location: Left arm, Patient Position: Sitting)   Pulse 92   Temp 36.6 °C (97.9 °F)   Wt 76.2 kg (168 lb)   BMI 34.51 kg/m²   BSA Body surface area is 1.77 meters squared.      Physical Exam  Constitutional:       General: She is not in acute distress.     Appearance: Normal appearance. She is not toxic-appearing.   HENT:      Head: Normocephalic.      Right Ear: Tympanic membrane, ear canal and external ear normal.      Ears:      Comments: erythema and bulging to left tympanic membrane small chunk of wax on the side as well  Eyes:      Conjunctiva/sclera: Conjunctivae normal.      Pupils: Pupils are equal, round, and reactive to light.   Cardiovascular:      Rate and Rhythm: Normal rate and regular rhythm.      Pulses: Normal pulses.      Heart sounds: Normal heart sounds.   Pulmonary:      Effort: No respiratory distress.      Breath sounds: No wheezing, rhonchi or rales.   Musculoskeletal:         General: No swelling or tenderness.   Skin:     Findings: No lesion or rash.   Neurological:      General: No focal deficit present.      Mental Status: She is alert and oriented to person, place, and time. Mental status is at baseline.       Gait: Gait normal.   Psychiatric:         Mood and Affect: Mood normal.         Behavior: Behavior normal.         Thought Content: Thought content normal.         Judgment: Judgment normal.       Lab on 08/23/2024   Component Date Value Ref Range Status    Methylmalonic Acid, S 08/23/2024 0.17  0.00 - 0.40 umol/L Final    INTERPRETIVE INFORMATION: MMA Serum/Plasma,                             Vitamin B12 Status    This test was developed and its performance characteristics   determined by [facility]. It has not been cleared or   approved by the US Food and Drug Administration. This test was   performed in a CLIA certified laboratory and is intended for   clinical purposes.  Performed By: [facility]  [address] [place] [id number]  : Carlos Myrick MD, PhD  CLIA Number: [id number]   Lab on 08/19/2024   Component Date Value Ref Range Status    WBC 08/19/2024 4.5  4.4 - 11.3 x10*3/uL Final    nRBC 08/19/2024 0.0  0.0 - 0.0 /100 WBCs Final    RBC 08/19/2024 4.20  4.00 - 5.20 x10*6/uL Final    Hemoglobin 08/19/2024 12.2  12.0 - 16.0 g/dL Final    Hematocrit 08/19/2024 38.6  36.0 - 46.0 % Final    MCV 08/19/2024 92  80 - 100 fL Final    MCH 08/19/2024 29.0  26.0 - 34.0 pg Final    MCHC 08/19/2024 31.6 (L)  32.0 - 36.0 g/dL Final    RDW 08/19/2024 12.7  11.5 - 14.5 % Final    Platelets 08/19/2024 218  150 - 450 x10*3/uL Final    Neutrophils % 08/19/2024 59.3  40.0 - 80.0 % Final    Immature Granulocytes %, Automated 08/19/2024 0.4  0.0 - 0.9 % Final    Immature Granulocyte Count (IG) includes promyelocytes, myelocytes and metamyelocytes but does not include bands. Percent differential counts (%) should be interpreted in the context of the absolute cell counts (cells/UL).    Lymphocytes % 08/19/2024 25.7  13.0 - 44.0 % Final    Monocytes % 08/19/2024 11.4  2.0 - 10.0 % Final    Eosinophils % 08/19/2024 2.5  0.0 - 6.0 % Final    Basophils % 08/19/2024 0.7  0.0 - 2.0 % Final     Neutrophils Absolute 08/19/2024 2.66  1.60 - 5.50 x10*3/uL Final    Percent differential counts (%) should be interpreted in the context of the absolute cell counts (cells/uL).    Immature Granulocytes Absolute, Au* 08/19/2024 0.02  0.00 - 0.50 x10*3/uL Final    Lymphocytes Absolute 08/19/2024 1.15  0.80 - 3.00 x10*3/uL Final    Monocytes Absolute 08/19/2024 0.51  0.05 - 0.80 x10*3/uL Final    Eosinophils Absolute 08/19/2024 0.11  0.00 - 0.40 x10*3/uL Final    Basophils Absolute 08/19/2024 0.03  0.00 - 0.10 x10*3/uL Final    Glucose 08/19/2024 97  74 - 99 mg/dL Final    Sodium 08/19/2024 140  136 - 145 mmol/L Final    Potassium 08/19/2024 4.4  3.5 - 5.3 mmol/L Final    Chloride 08/19/2024 104  98 - 107 mmol/L Final    Bicarbonate 08/19/2024 28  21 - 32 mmol/L Final    Anion Gap 08/19/2024 12  10 - 20 mmol/L Final    Urea Nitrogen 08/19/2024 15  6 - 23 mg/dL Final    Creatinine 08/19/2024 0.70  0.50 - 1.05 mg/dL Final    eGFR 08/19/2024 87  >60 mL/min/1.73m*2 Final    Calculations of estimated GFR are performed using the 2021 CKD-EPI Study Refit equation without the race variable for the IDMS-Traceable creatinine methods.  https://jasn.asnjournals.org/content/early/2021/09/22/ASN.1215752002    Calcium 08/19/2024 9.2  8.6 - 10.6 mg/dL Final    Albumin 08/19/2024 4.0  3.4 - 5.0 g/dL Final    Alkaline Phosphatase 08/19/2024 83  33 - 136 U/L Final    Total Protein 08/19/2024 6.5  6.4 - 8.2 g/dL Final    AST 08/19/2024 18  9 - 39 U/L Final    Bilirubin, Total 08/19/2024 0.4  0.0 - 1.2 mg/dL Final    ALT 08/19/2024 17  7 - 45 U/L Final    Patients treated with Sulfasalazine may generate falsely decreased results for ALT.    Cholesterol 08/19/2024 187  0 - 199 mg/dL Final          Age      Desirable   Borderline High   High     0-19 Y     0 - 169       170 - 199     >/= 200    20-24 Y     0 - 189       190 - 224     >/= 225         >24 Y     0 - 199       200 - 239     >/= 240   **All ranges are based on fasting samples.  Specific   therapeutic targets will vary based on patient-specific   cardiac risk.    Pediatric guidelines reference:Pediatrics 2011, 128(S5).Adult guidelines reference: NCEP ATPIII Guidelines,SAURABH 2001, 258:2486-97    Venipuncture immediately after or during the administration of Metamizole may lead to falsely low results. Testing should be performed immediately prior to Metamizole dosing.    HDL-Cholesterol 08/19/2024 53.9  mg/dL Final      Age       Very Low   Low     Normal    High    0-19 Y    < 35      < 40     40-45     ----  20-24 Y    ----     < 40      >45      ----        >24 Y      ----     < 40     40-60      >60      Cholesterol/HDL Ratio 08/19/2024 3.5   Final      Ref Values  Desirable  < 3.4  High Risk  > 5.0    LDL Calculated 08/19/2024 108 (H)  <=99 mg/dL Final                                Near   Borderline      AGE      Desirable  Optimal    High     High     Very High     0-19 Y     0 - 109     ---    110-129   >/= 130     ----    20-24 Y     0 - 119     ---    120-159   >/= 160     ----      >24 Y     0 -  99   100-129  130-159   160-189     >/=190      VLDL 08/19/2024 25  0 - 40 mg/dL Final    Triglycerides 08/19/2024 124  0 - 149 mg/dL Final       Age         Desirable   Borderline High   High     Very High   0 D-90 D    19 - 174         ----         ----        ----  91 D- 9 Y     0 -  74        75 -  99     >/= 100      ----    10-19 Y     0 -  89        90 - 129     >/= 130      ----    20-24 Y     0 - 114       115 - 149     >/= 150      ----         >24 Y     0 - 149       150 - 199    200- 499    >/= 500    Venipuncture immediately after or during the administration of Metamizole may lead to falsely low results. Testing should be performed immediately prior to Metamizole dosing.    Non HDL Cholesterol 08/19/2024 133  0 - 149 mg/dL Final          Age       Desirable   Borderline High   High     Very High     0-19 Y     0 - 119       120 - 144     >/= 145    >/= 160    20-24 Y     0 -  149       150 - 189     >/= 190      ----         >24 Y    30 mg/dL above LDL Cholesterol goal      Vitamin B12 08/19/2024 322  211 - 911 pg/mL Final    Vitamin D, 25-Hydroxy, Total 08/19/2024 36  30 - 100 ng/mL Final    Thyroid Stimulating Hormone 08/19/2024 2.41  0.44 - 3.98 mIU/L Final     Current Outpatient Medications on File Prior to Visit   Medication Sig Dispense Refill    esomeprazole (NexIUM) 40 mg DR capsule TAKE 1 CAPSULE BY MOUTH EVERY DAY AS DIRECTED NOT COVERED      nystatin (Mycostatin) 100,000 unit/gram powder Apply 1 Application topically 2 times a day. 30 g 1    pitavastatin calcium 4 mg tablet Take 4 mg by mouth once daily. 30 tablet 11    [DISCONTINUED] doxycycline (Vibramycin) 100 mg capsule Take 1 capsule (100 mg) by mouth 2 times a day for 10 days. Take with at least 8 ounces (large glass) of water, do not lie down for 30 minutes after 20 capsule 0     No current facility-administered medications on file prior to visit.     No images are attached to the encounter.            Assessment/Plan   Diagnoses and all orders for this visit:  Acute serous otitis media of left ear, recurrence not specified  -     mupirocin (Bactroban) 2 % ointment; Apply topically 3 times a day for 10 days. apply to affected area      1.  Patient to hold off on antibiotics, start on ocean nasal spray   2.  Patient to follow up with ENT for further evaluation of TM  3.  Patient to call for questions or concerns

## 2025-01-15 DIAGNOSIS — H65.02 ACUTE SEROUS OTITIS MEDIA OF LEFT EAR, RECURRENCE NOT SPECIFIED: ICD-10-CM

## 2025-01-15 RX ORDER — MUPIROCIN 20 MG/G
OINTMENT TOPICAL 3 TIMES DAILY
Qty: 22 G | Refills: 0 | Status: SHIPPED | OUTPATIENT
Start: 2025-01-15 | End: 2025-01-25

## 2025-01-15 RX ORDER — ROSUVASTATIN CALCIUM 10 MG/1
10 TABLET, COATED ORAL DAILY
Qty: 100 TABLET | Refills: 3 | Status: SHIPPED | OUTPATIENT
Start: 2025-01-15 | End: 2026-02-19

## 2025-02-05 ENCOUNTER — TELEPHONE (OUTPATIENT)
Dept: PRIMARY CARE | Facility: CLINIC | Age: 80
End: 2025-02-05
Payer: MEDICARE

## 2025-02-05 NOTE — TELEPHONE ENCOUNTER
Pt stated having sh was having really bad back pain and stomach pain with this medicine pt asking for advise please advise    rosuvastatin (Crestor) 10 mg tablet

## 2025-02-24 ENCOUNTER — TELEPHONE (OUTPATIENT)
Dept: PRIMARY CARE | Facility: CLINIC | Age: 80
End: 2025-02-24

## 2025-02-24 ENCOUNTER — APPOINTMENT (OUTPATIENT)
Dept: PRIMARY CARE | Facility: CLINIC | Age: 80
End: 2025-02-24
Payer: MEDICARE

## 2025-02-24 VITALS
BODY MASS INDEX: 34.3 KG/M2 | SYSTOLIC BLOOD PRESSURE: 130 MMHG | WEIGHT: 167 LBS | DIASTOLIC BLOOD PRESSURE: 72 MMHG | HEART RATE: 88 BPM

## 2025-02-24 DIAGNOSIS — E78.00 HYPERCHOLESTEROLEMIA: Primary | ICD-10-CM

## 2025-02-24 PROCEDURE — 1123F ACP DISCUSS/DSCN MKR DOCD: CPT | Performed by: FAMILY MEDICINE

## 2025-02-24 PROCEDURE — 1159F MED LIST DOCD IN RCRD: CPT | Performed by: FAMILY MEDICINE

## 2025-02-24 PROCEDURE — 1036F TOBACCO NON-USER: CPT | Performed by: FAMILY MEDICINE

## 2025-02-24 PROCEDURE — 99214 OFFICE O/P EST MOD 30 MIN: CPT | Performed by: FAMILY MEDICINE

## 2025-02-24 RX ORDER — PITAVASTATIN CALCIUM 4.18 MG/1
4 TABLET, FILM COATED ORAL NIGHTLY
Qty: 90 TABLET | Refills: 3 | Status: SHIPPED | OUTPATIENT
Start: 2025-02-24

## 2025-02-24 RX ORDER — MULTIVITAMIN WITH MINERALS
1 TABLET ORAL
COMMUNITY

## 2025-02-24 ASSESSMENT — PATIENT HEALTH QUESTIONNAIRE - PHQ9
1. LITTLE INTEREST OR PLEASURE IN DOING THINGS: NOT AT ALL
10. IF YOU CHECKED OFF ANY PROBLEMS, HOW DIFFICULT HAVE THESE PROBLEMS MADE IT FOR YOU TO DO YOUR WORK, TAKE CARE OF THINGS AT HOME, OR GET ALONG WITH OTHER PEOPLE: NOT DIFFICULT AT ALL
SUM OF ALL RESPONSES TO PHQ9 QUESTIONS 1 AND 2: 0
2. FEELING DOWN, DEPRESSED OR HOPELESS: NOT AT ALL

## 2025-02-24 ASSESSMENT — ENCOUNTER SYMPTOMS
ARTHRALGIAS: 0
OCCASIONAL FEELINGS OF UNSTEADINESS: 0
DEPRESSION: 0
DIZZINESS: 0
FEVER: 0
PALPITATIONS: 0
COUGH: 0
ACTIVITY CHANGE: 0
LOSS OF SENSATION IN FEET: 0
BACK PAIN: 0
LIGHT-HEADEDNESS: 0
FATIGUE: 0
FACIAL ASYMMETRY: 0
CHEST TIGHTNESS: 0
CHOKING: 0
COLOR CHANGE: 0
HEADACHES: 0
APPETITE CHANGE: 0

## 2025-02-24 NOTE — PROGRESS NOTES
Subjective   Patient ID: Julia Antonio is a 79 y.o. female who presents for Follow-up (Cholesterol ).    HPI   Patient reports that she was having some side effects on her rosuvastatin is here to follow-up since she has been off of the rosuvastatin for 3 months.    Review of Systems   Constitutional:  Negative for activity change, appetite change, fatigue and fever.   HENT:  Negative for congestion.    Respiratory:  Negative for cough, choking and chest tightness.    Cardiovascular:  Negative for chest pain, palpitations and leg swelling.   Musculoskeletal:  Negative for arthralgias, back pain and gait problem.   Skin:  Negative for color change and pallor.   Neurological:  Negative for dizziness, facial asymmetry, light-headedness and headaches.       Objective   /72 (BP Location: Right arm, Patient Position: Sitting)   Pulse 88   Wt 75.8 kg (167 lb)   BMI 34.30 kg/m²   BSA Body surface area is 1.77 meters squared.      Physical Exam  Constitutional:       General: She is not in acute distress.     Appearance: Normal appearance. She is not toxic-appearing.   HENT:      Head: Normocephalic.      Right Ear: Tympanic membrane, ear canal and external ear normal.      Left Ear: Tympanic membrane, ear canal and external ear normal.   Eyes:      Conjunctiva/sclera: Conjunctivae normal.      Pupils: Pupils are equal, round, and reactive to light.   Cardiovascular:      Rate and Rhythm: Normal rate and regular rhythm.      Pulses: Normal pulses.      Heart sounds: Normal heart sounds.   Pulmonary:      Effort: No respiratory distress.      Breath sounds: No wheezing, rhonchi or rales.   Abdominal:      General: Bowel sounds are normal. There is no distension.      Palpations: Abdomen is soft.      Tenderness: There is no abdominal tenderness.   Musculoskeletal:         General: No swelling or tenderness.   Skin:     Findings: No lesion or rash.   Neurological:      General: No focal deficit present.      Mental  Status: She is alert and oriented to person, place, and time. Mental status is at baseline.      Gait: Gait normal.   Psychiatric:         Mood and Affect: Mood normal.         Behavior: Behavior normal.         Thought Content: Thought content normal.         Judgment: Judgment normal.       Lab on 08/23/2024   Component Date Value Ref Range Status    Methylmalonic Acid, S 08/23/2024 0.17  0.00 - 0.40 umol/L Final    INTERPRETIVE INFORMATION: MMA Serum/Plasma,                             Vitamin B12 Status    This test was developed and its performance characteristics   determined by [facility]. It has not been cleared or   approved by the US Food and Drug Administration. This test was   performed in a CLIA certified laboratory and is intended for   clinical purposes.  Performed By: [facility]  [address] [place] [id number]  : Carlos Myrick MD, PhD  CLIA Number: [id number]   Lab on 08/19/2024   Component Date Value Ref Range Status    WBC 08/19/2024 4.5  4.4 - 11.3 x10*3/uL Final    nRBC 08/19/2024 0.0  0.0 - 0.0 /100 WBCs Final    RBC 08/19/2024 4.20  4.00 - 5.20 x10*6/uL Final    Hemoglobin 08/19/2024 12.2  12.0 - 16.0 g/dL Final    Hematocrit 08/19/2024 38.6  36.0 - 46.0 % Final    MCV 08/19/2024 92  80 - 100 fL Final    MCH 08/19/2024 29.0  26.0 - 34.0 pg Final    MCHC 08/19/2024 31.6 (L)  32.0 - 36.0 g/dL Final    RDW 08/19/2024 12.7  11.5 - 14.5 % Final    Platelets 08/19/2024 218  150 - 450 x10*3/uL Final    Neutrophils % 08/19/2024 59.3  40.0 - 80.0 % Final    Immature Granulocytes %, Automated 08/19/2024 0.4  0.0 - 0.9 % Final    Immature Granulocyte Count (IG) includes promyelocytes, myelocytes and metamyelocytes but does not include bands. Percent differential counts (%) should be interpreted in the context of the absolute cell counts (cells/UL).    Lymphocytes % 08/19/2024 25.7  13.0 - 44.0 % Final    Monocytes % 08/19/2024 11.4  2.0 - 10.0 % Final     Eosinophils % 08/19/2024 2.5  0.0 - 6.0 % Final    Basophils % 08/19/2024 0.7  0.0 - 2.0 % Final    Neutrophils Absolute 08/19/2024 2.66  1.60 - 5.50 x10*3/uL Final    Percent differential counts (%) should be interpreted in the context of the absolute cell counts (cells/uL).    Immature Granulocytes Absolute, Au* 08/19/2024 0.02  0.00 - 0.50 x10*3/uL Final    Lymphocytes Absolute 08/19/2024 1.15  0.80 - 3.00 x10*3/uL Final    Monocytes Absolute 08/19/2024 0.51  0.05 - 0.80 x10*3/uL Final    Eosinophils Absolute 08/19/2024 0.11  0.00 - 0.40 x10*3/uL Final    Basophils Absolute 08/19/2024 0.03  0.00 - 0.10 x10*3/uL Final    Glucose 08/19/2024 97  74 - 99 mg/dL Final    Sodium 08/19/2024 140  136 - 145 mmol/L Final    Potassium 08/19/2024 4.4  3.5 - 5.3 mmol/L Final    Chloride 08/19/2024 104  98 - 107 mmol/L Final    Bicarbonate 08/19/2024 28  21 - 32 mmol/L Final    Anion Gap 08/19/2024 12  10 - 20 mmol/L Final    Urea Nitrogen 08/19/2024 15  6 - 23 mg/dL Final    Creatinine 08/19/2024 0.70  0.50 - 1.05 mg/dL Final    eGFR 08/19/2024 87  >60 mL/min/1.73m*2 Final    Calculations of estimated GFR are performed using the 2021 CKD-EPI Study Refit equation without the race variable for the IDMS-Traceable creatinine methods.  https://jasn.asnjournals.org/content/early/2021/09/22/ASN.3124430586    Calcium 08/19/2024 9.2  8.6 - 10.6 mg/dL Final    Albumin 08/19/2024 4.0  3.4 - 5.0 g/dL Final    Alkaline Phosphatase 08/19/2024 83  33 - 136 U/L Final    Total Protein 08/19/2024 6.5  6.4 - 8.2 g/dL Final    AST 08/19/2024 18  9 - 39 U/L Final    Bilirubin, Total 08/19/2024 0.4  0.0 - 1.2 mg/dL Final    ALT 08/19/2024 17  7 - 45 U/L Final    Patients treated with Sulfasalazine may generate falsely decreased results for ALT.    Cholesterol 08/19/2024 187  0 - 199 mg/dL Final          Age      Desirable   Borderline High   High     0-19 Y     0 - 169       170 - 199     >/= 200    20-24 Y     0 - 189       190 - 224     >/= 225          >24 Y     0 - 199       200 - 239     >/= 240   **All ranges are based on fasting samples. Specific   therapeutic targets will vary based on patient-specific   cardiac risk.    Pediatric guidelines reference:Pediatrics 2011, 128(S5).Adult guidelines reference: NCEP ATPIII Guidelines,SAURABH 2001, 258:2486-97    Venipuncture immediately after or during the administration of Metamizole may lead to falsely low results. Testing should be performed immediately prior to Metamizole dosing.    HDL-Cholesterol 08/19/2024 53.9  mg/dL Final      Age       Very Low   Low     Normal    High    0-19 Y    < 35      < 40     40-45     ----  20-24 Y    ----     < 40      >45      ----        >24 Y      ----     < 40     40-60      >60      Cholesterol/HDL Ratio 08/19/2024 3.5   Final      Ref Values  Desirable  < 3.4  High Risk  > 5.0    LDL Calculated 08/19/2024 108 (H)  <=99 mg/dL Final                                Near   Borderline      AGE      Desirable  Optimal    High     High     Very High     0-19 Y     0 - 109     ---    110-129   >/= 130     ----    20-24 Y     0 - 119     ---    120-159   >/= 160     ----      >24 Y     0 -  99   100-129  130-159   160-189     >/=190      VLDL 08/19/2024 25  0 - 40 mg/dL Final    Triglycerides 08/19/2024 124  0 - 149 mg/dL Final       Age         Desirable   Borderline High   High     Very High   0 D-90 D    19 - 174         ----         ----        ----  91 D- 9 Y     0 -  74        75 -  99     >/= 100      ----    10-19 Y     0 -  89        90 - 129     >/= 130      ----    20-24 Y     0 - 114       115 - 149     >/= 150      ----         >24 Y     0 - 149       150 - 199    200- 499    >/= 500    Venipuncture immediately after or during the administration of Metamizole may lead to falsely low results. Testing should be performed immediately prior to Metamizole dosing.    Non HDL Cholesterol 08/19/2024 133  0 - 149 mg/dL Final          Age       Desirable   Borderline High   High      Very High     0-19 Y     0 - 119       120 - 144     >/= 145    >/= 160    20-24 Y     0 - 149       150 - 189     >/= 190      ----         >24 Y    30 mg/dL above LDL Cholesterol goal      Vitamin B12 08/19/2024 322  211 - 911 pg/mL Final    Vitamin D, 25-Hydroxy, Total 08/19/2024 36  30 - 100 ng/mL Final    Thyroid Stimulating Hormone 08/19/2024 2.41  0.44 - 3.98 mIU/L Final     Current Outpatient Medications on File Prior to Visit   Medication Sig Dispense Refill    esomeprazole (NexIUM) 40 mg DR capsule TAKE 1 CAPSULE BY MOUTH EVERY DAY AS DIRECTED NOT COVERED      multivitamin with minerals (Multiple Vitamin-Minerals) tablet Take 1 tablet by mouth once daily.      nystatin (Mycostatin) 100,000 unit/gram powder Apply 1 Application topically 2 times a day. 30 g 1    [DISCONTINUED] rosuvastatin (Crestor) 10 mg tablet Take 1 tablet (10 mg) by mouth once daily. (Patient not taking: Reported on 2/24/2025) 100 tablet 3     No current facility-administered medications on file prior to visit.     No images are attached to the encounter.            Assessment/Plan   Diagnoses and all orders for this visit:  Hypercholesterolemia  -     pitavastatin calcium 4 mg tablet; Take 4 mg by mouth at 3:00 in the morning.    Patient to resume the pitavastatin  Patient to call if questions or concerns

## 2025-05-14 DIAGNOSIS — B35.6 JOCK ITCH: ICD-10-CM

## 2025-05-14 RX ORDER — CLOTRIMAZOLE AND BETAMETHASONE DIPROPIONATE 10; .64 MG/G; MG/G
1 CREAM TOPICAL 2 TIMES DAILY
Qty: 15 G | Refills: 0 | Status: SHIPPED | OUTPATIENT
Start: 2025-05-14

## 2025-06-10 DIAGNOSIS — E78.00 HYPERCHOLESTEROLEMIA: ICD-10-CM

## 2025-06-10 RX ORDER — PITAVASTATIN CALCIUM 4.18 MG/1
4 TABLET, FILM COATED ORAL NIGHTLY
Qty: 90 TABLET | Refills: 3 | Status: SHIPPED | OUTPATIENT
Start: 2025-06-10

## 2025-06-30 ENCOUNTER — TELEPHONE (OUTPATIENT)
Dept: PRIMARY CARE | Facility: CLINIC | Age: 80
End: 2025-06-30
Payer: MEDICARE

## 2025-06-30 NOTE — TELEPHONE ENCOUNTER
Called and lm for pt to cb and schedule surgical clearance appt for sx at [facility]

## 2025-07-08 ENCOUNTER — APPOINTMENT (OUTPATIENT)
Dept: PRIMARY CARE | Facility: CLINIC | Age: 80
End: 2025-07-08
Payer: MEDICARE

## 2025-07-08 VITALS
OXYGEN SATURATION: 97 % | WEIGHT: 164.6 LBS | SYSTOLIC BLOOD PRESSURE: 130 MMHG | DIASTOLIC BLOOD PRESSURE: 70 MMHG | HEART RATE: 98 BPM | BODY MASS INDEX: 33.81 KG/M2

## 2025-07-08 DIAGNOSIS — M54.50 LOW BACK PAIN WITHOUT SCIATICA, UNSPECIFIED BACK PAIN LATERALITY, UNSPECIFIED CHRONICITY: ICD-10-CM

## 2025-07-08 DIAGNOSIS — E78.00 HYPERCHOLESTEROLEMIA: ICD-10-CM

## 2025-07-08 DIAGNOSIS — G45.9 TIA (TRANSIENT ISCHEMIC ATTACK): ICD-10-CM

## 2025-07-08 DIAGNOSIS — I34.0 MITRAL VALVE INSUFFICIENCY, UNSPECIFIED ETIOLOGY: ICD-10-CM

## 2025-07-08 DIAGNOSIS — I35.9 AORTIC VALVE DISORDER: ICD-10-CM

## 2025-07-08 DIAGNOSIS — Z01.818 PREOPERATIVE CLEARANCE: Primary | ICD-10-CM

## 2025-07-08 DIAGNOSIS — B35.3 TINEA PEDIS OF BOTH FEET: ICD-10-CM

## 2025-07-08 PROBLEM — H71.01 CHOLESTEATOMA OF ATTIC OF RIGHT EAR: Status: ACTIVE | Noted: 2025-04-16

## 2025-07-08 PROBLEM — K21.9 GASTROESOPHAGEAL REFLUX DISEASE: Status: ACTIVE | Noted: 2025-02-12

## 2025-07-08 PROBLEM — G45.3 AMAUROSIS FUGAX: Status: RESOLVED | Noted: 2023-08-17 | Resolved: 2025-07-08

## 2025-07-08 PROBLEM — H72.92 PERFORATION OF LEFT TYMPANIC MEMBRANE: Status: RESOLVED | Noted: 2025-04-16 | Resolved: 2025-07-08

## 2025-07-08 PROBLEM — H90.6 MIXED CONDUCTIVE AND SENSORINEURAL HEARING LOSS OF BOTH EARS: Status: ACTIVE | Noted: 2025-04-16

## 2025-07-08 PROCEDURE — 93000 ELECTROCARDIOGRAM COMPLETE: CPT | Performed by: FAMILY MEDICINE

## 2025-07-08 PROCEDURE — 99214 OFFICE O/P EST MOD 30 MIN: CPT | Performed by: FAMILY MEDICINE

## 2025-07-08 PROCEDURE — 1159F MED LIST DOCD IN RCRD: CPT | Performed by: FAMILY MEDICINE

## 2025-07-08 PROCEDURE — 1036F TOBACCO NON-USER: CPT | Performed by: FAMILY MEDICINE

## 2025-07-08 RX ORDER — VIT C/E/ZN/COPPR/LUTEIN/ZEAXAN 250MG-90MG
1 CAPSULE ORAL
COMMUNITY
Start: 2025-06-23

## 2025-07-08 RX ORDER — CLOTRIMAZOLE AND BETAMETHASONE DIPROPIONATE 10; .64 MG/G; MG/G
1 CREAM TOPICAL 2 TIMES DAILY
Qty: 45 G | Refills: 0 | Status: SHIPPED | OUTPATIENT
Start: 2025-07-08 | End: 2025-11-05

## 2025-07-08 ASSESSMENT — ENCOUNTER SYMPTOMS
FACIAL ASYMMETRY: 0
CHOKING: 0
FEVER: 0
DIZZINESS: 0
CHEST TIGHTNESS: 0
BACK PAIN: 0
COUGH: 0
ARTHRALGIAS: 0
APPETITE CHANGE: 0
LIGHT-HEADEDNESS: 0
HEADACHES: 0
PALPITATIONS: 0
FATIGUE: 0
COLOR CHANGE: 0
ACTIVITY CHANGE: 0

## 2025-07-08 NOTE — PROGRESS NOTES
Subjective   Patient ID: Julia Antonio is a 80 y.o. female who presents for Pre-op Exam (Dr Wright 7-10-25).    HPI     Patient presents for preop clearance for spinal surgery on 7/10/2025.     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 Solomon     Patient denies other complaints.     Review of Systems   Constitutional:  Negative for activity change, appetite change, fatigue and fever.   HENT:  Negative for congestion.    Respiratory:  Negative for cough, choking and chest tightness.    Cardiovascular:  Negative for chest pain, palpitations and leg swelling.   Musculoskeletal:  Negative for arthralgias, back pain and gait problem.   Skin:  Negative for color change and pallor.   Neurological:  Negative for dizziness, facial asymmetry, light-headedness and headaches.       Objective   /70 (BP Location: Left arm, Patient Position: Sitting)   Pulse 98   Wt 74.7 kg (164 lb 9.6 oz)   SpO2 97%   BMI 33.81 kg/m²   BSA Body surface area is 1.76 meters squared.      Physical Exam  Constitutional:       General: She is not in acute distress.     Appearance: Normal appearance. She is not toxic-appearing.   HENT:      Head: Normocephalic.      Right Ear: Tympanic membrane, ear canal and external ear normal.      Left Ear: Tympanic membrane, ear canal and external ear normal.   Eyes:      Conjunctiva/sclera: Conjunctivae normal.      Pupils: Pupils are equal, round, and reactive to light.   Cardiovascular:      Rate and Rhythm: Normal rate and regular rhythm.      Pulses: Normal pulses.      Heart sounds: Murmur heard.   Pulmonary:      Effort: No respiratory distress.      Breath sounds: No wheezing, rhonchi or rales.   Abdominal:      General: Bowel sounds are normal. There is no distension.       Palpations: Abdomen is soft.      Tenderness: There is no abdominal tenderness.   Musculoskeletal:         General: No swelling or tenderness.   Skin:     Findings: Rash present. No lesion.      Comments: tinea pedis bilateral feet, dry cracked dorsum of biateral feet   Neurological:      General: No focal deficit present.      Mental Status: She is alert and oriented to person, place, and time. Mental status is at baseline.      Gait: Gait normal.   Psychiatric:         Mood and Affect: Mood normal.         Behavior: Behavior normal.         Thought Content: Thought content normal.         Judgment: Judgment normal.       Lab on 08/23/2024   Component Date Value Ref Range Status    Methylmalonic Acid, S 08/23/2024 0.17  0.00 - 0.40 umol/L Final    INTERPRETIVE INFORMATION: MMA Serum/Plasma,                             Vitamin B12 Status    This test was developed and its performance characteristics   determined by [facility]. It has not been cleared or   approved by the [place] Food and Drug Administration. This test was   performed in a CLIA certified laboratory and is intended for   clinical purposes.  Performed By: [facility]  [address] [place] [id number]  : Carlos Myrick MD, PhD  CLIA Number: [id number]   Lab on 08/19/2024   Component Date Value Ref Range Status    WBC 08/19/2024 4.5  4.4 - 11.3 x10*3/uL Final    nRBC 08/19/2024 0.0  0.0 - 0.0 /100 WBCs Final    RBC 08/19/2024 4.20  4.00 - 5.20 x10*6/uL Final    Hemoglobin 08/19/2024 12.2  12.0 - 16.0 g/dL Final    Hematocrit 08/19/2024 38.6  36.0 - 46.0 % Final    MCV 08/19/2024 92  80 - 100 fL Final    MCH 08/19/2024 29.0  26.0 - 34.0 pg Final    MCHC 08/19/2024 31.6 (L)  32.0 - 36.0 g/dL Final    RDW 08/19/2024 12.7  11.5 - 14.5 % Final    Platelets 08/19/2024 218  150 - 450 x10*3/uL Final    Neutrophils % 08/19/2024 59.3  40.0 - 80.0 % Final    Immature Granulocytes %, Automated 08/19/2024 0.4  0.0 - 0.9 %  Final    Immature Granulocyte Count (IG) includes promyelocytes, myelocytes and metamyelocytes but does not include bands. Percent differential counts (%) should be interpreted in the context of the absolute cell counts (cells/UL).    Lymphocytes % 08/19/2024 25.7  13.0 - 44.0 % Final    Monocytes % 08/19/2024 11.4  2.0 - 10.0 % Final    Eosinophils % 08/19/2024 2.5  0.0 - 6.0 % Final    Basophils % 08/19/2024 0.7  0.0 - 2.0 % Final    Neutrophils Absolute 08/19/2024 2.66  1.60 - 5.50 x10*3/uL Final    Percent differential counts (%) should be interpreted in the context of the absolute cell counts (cells/uL).    Immature Granulocytes Absolute, Au* 08/19/2024 0.02  0.00 - 0.50 x10*3/uL Final    Lymphocytes Absolute 08/19/2024 1.15  0.80 - 3.00 x10*3/uL Final    Monocytes Absolute 08/19/2024 0.51  0.05 - 0.80 x10*3/uL Final    Eosinophils Absolute 08/19/2024 0.11  0.00 - 0.40 x10*3/uL Final    Basophils Absolute 08/19/2024 0.03  0.00 - 0.10 x10*3/uL Final    Glucose 08/19/2024 97  74 - 99 mg/dL Final    Sodium 08/19/2024 140  136 - 145 mmol/L Final    Potassium 08/19/2024 4.4  3.5 - 5.3 mmol/L Final    Chloride 08/19/2024 104  98 - 107 mmol/L Final    Bicarbonate 08/19/2024 28  21 - 32 mmol/L Final    Anion Gap 08/19/2024 12  10 - 20 mmol/L Final    Urea Nitrogen 08/19/2024 15  6 - 23 mg/dL Final    Creatinine 08/19/2024 0.70  0.50 - 1.05 mg/dL Final    eGFR 08/19/2024 87  >60 mL/min/1.73m*2 Final    Calculations of estimated GFR are performed using the 2021 CKD-EPI Study Refit equation without the race variable for the IDMS-Traceable creatinine methods.  https://jasn.asnjournals.org/content/early/2021/09/22/ASN.1965383721    Calcium 08/19/2024 9.2  8.6 - 10.6 mg/dL Final    Albumin 08/19/2024 4.0  3.4 - 5.0 g/dL Final    Alkaline Phosphatase 08/19/2024 83  33 - 136 U/L Final    Total Protein 08/19/2024 6.5  6.4 - 8.2 g/dL Final    AST 08/19/2024 18  9 - 39 U/L Final    Bilirubin, Total 08/19/2024 0.4  0.0 - 1.2 mg/dL  Final    ALT 08/19/2024 17  7 - 45 U/L Final    Patients treated with Sulfasalazine may generate falsely decreased results for ALT.    Cholesterol 08/19/2024 187  0 - 199 mg/dL Final          Age      Desirable   Borderline High   High     0-19 Y     0 - 169       170 - 199     >/= 200    20-24 Y     0 - 189       190 - 224     >/= 225         >24 Y     0 - 199       200 - 239     >/= 240   **All ranges are based on fasting samples. Specific   therapeutic targets will vary based on patient-specific   cardiac risk.    Pediatric guidelines reference:Pediatrics 2011, 128(S5).Adult guidelines reference: NCEP ATPIII Guidelines,SAURABH 2001, 258:2486-97    Venipuncture immediately after or during the administration of Metamizole may lead to falsely low results. Testing should be performed immediately prior to Metamizole dosing.    HDL-Cholesterol 08/19/2024 53.9  mg/dL Final      Age       Very Low   Low     Normal    High    0-19 Y    < 35      < 40     40-45     ----  20-24 Y    ----     < 40      >45      ----        >24 Y      ----     < 40     40-60      >60      Cholesterol/HDL Ratio 08/19/2024 3.5   Final      Ref Values  Desirable  < 3.4  High Risk  > 5.0    LDL Calculated 08/19/2024 108 (H)  <=99 mg/dL Final                                Near   Borderline      AGE      Desirable  Optimal    High     High     Very High     0-19 Y     0 - 109     ---    110-129   >/= 130     ----    20-24 Y     0 - 119     ---    120-159   >/= 160     ----      >24 Y     0 -  99   100-129  130-159   160-189     >/=190      VLDL 08/19/2024 25  0 - 40 mg/dL Final    Triglycerides 08/19/2024 124  0 - 149 mg/dL Final       Age         Desirable   Borderline High   High     Very High   0 D-90 D    19 - 174         ----         ----        ----  91 D- 9 Y     0 -  74        75 -  99     >/= 100      ----    10-19 Y     0 -  89        90 - 129     >/= 130      ----    20-24 Y     0 - 114       115 - 149     >/= 150      ----         >24 Y      0 - 149       150 - 199    200- 499    >/= 500    Venipuncture immediately after or during the administration of Metamizole may lead to falsely low results. Testing should be performed immediately prior to Metamizole dosing.    Non HDL Cholesterol 08/19/2024 133  0 - 149 mg/dL Final          Age       Desirable   Borderline High   High     Very High     0-19 Y     0 - 119       120 - 144     >/= 145    >/= 160    20-24 Y     0 - 149       150 - 189     >/= 190      ----         >24 Y    30 mg/dL above LDL Cholesterol goal      Vitamin B12 08/19/2024 322  211 - 911 pg/mL Final    Vitamin D, 25-Hydroxy, Total 08/19/2024 36  30 - 100 ng/mL Final    Thyroid Stimulating Hormone 08/19/2024 2.41  0.44 - 3.98 mIU/L Final     Medications Ordered Prior to Encounter[1]  No images are attached to the encounter.        Assessment/Plan   Diagnoses and all orders for this visit:  Preoperative clearance  -     ECG 12 Lead  Aortic valve disorder  -     ECG 12 Lead  Hypercholesterolemia  -     ECG 12 Lead  Mitral valve insufficiency, unspecified etiology  -     ECG 12 Lead  TIA (transient ischemic attack)  Low back pain without sciatica, unspecified back pain laterality, unspecified chronicity  Tinea pedis of both feet  -     clotrimazole-betamethasone (Lotrisone) cream; Apply 1 Application topically 2 times a day.    Patient is moderate risk for a high risk surgery  Patient to call if questions or concerns            [1]   Current Outpatient Medications on File Prior to Visit   Medication Sig Dispense Refill    cholecalciferol (Vitamin D-3) 125 mcg (5,000 units) capsule Take 1 capsule (125 mcg) by mouth early in the morning..      clotrimazole-betamethasone (Lotrisone) cream Apply 1 Application topically 2 times a day. 15 g 0    esomeprazole (NexIUM) 40 mg DR capsule TAKE 1 CAPSULE BY MOUTH EVERY DAY AS DIRECTED NOT COVERED      multivitamin with minerals (Multiple Vitamin-Minerals) tablet Take 1 tablet by mouth once daily.       nystatin (Mycostatin) 100,000 unit/gram powder Apply 1 Application topically 2 times a day. 30 g 1    pitavastatin calcium 4 mg tablet Take 4 mg by mouth at 3:00 in the morning. 90 tablet 3     No current facility-administered medications on file prior to visit.

## 2025-07-24 ENCOUNTER — PATIENT OUTREACH (OUTPATIENT)
Dept: PRIMARY CARE | Facility: CLINIC | Age: 80
End: 2025-07-24
Payer: MEDICARE

## 2025-07-24 RX ORDER — METOPROLOL SUCCINATE 25 MG/1
25 TABLET, EXTENDED RELEASE ORAL
COMMUNITY
Start: 2025-07-24 | End: 2025-10-22

## 2025-07-24 RX ORDER — VALSARTAN 40 MG/1
40 TABLET ORAL
COMMUNITY
Start: 2025-07-24 | End: 2025-10-22

## 2025-07-24 NOTE — PROGRESS NOTES
Discharge Facility: [facility]  Discharge Diagnosis: Pneumonia of left lower lobe due to infectious organism   Admission Date: 7/18/25  Discharge Date: 7/23/25    PCP Appointment Date: 7/29/25  Specialist Appointment Date: Ortho 7/25/25  Hospital Encounter and Summary Linked: Yes Hospital Encounter   See discharge assessment below for further details    Wrap Up  Wrap Up Additional Comments: I spoke to the patient. She states she is doing well since being discharged from the hospital. She denies shortness of breath and she says she has been able to get up and get around quite a bit. We reviewed her new medications and any changes made. I emphasized the importance of follow-up appointments with both her primary care physician and specialists to assess treatment response. She has an appointment with her spine surgeon tomorrow and Dr. Fletcher on Tuesday. The patient is aware of my availability for non-emergency concerns and has been provided with my contact information. (7/24/2025 10:11 AM)    Engagement  Call Start Time: 1006 (7/24/2025 10:11 AM)    Medications  Medications reviewed with patient/caregiver?: Yes (7/24/2025 10:11 AM)  Is the patient having any side effects they believe may be caused by any medication additions or changes?: No (7/24/2025 10:11 AM)  Does the patient have all medications ordered at discharge?: Yes (7/24/2025 10:11 AM)  Care Management Interventions: No intervention needed (7/24/2025 10:11 AM)  Prescription Comments: new prescriptions: eliquis, valsartan, metoprolol (7/24/2025 10:11 AM)  Is the patient taking all medications as directed (includes completed medication regime)?: Yes (7/24/2025 10:11 AM)  Care Management Interventions: Provided patient education (7/24/2025 10:11 AM)    Appointments  Does the patient have a primary care provider?: Yes (7/24/2025 10:11 AM)  Care Management Interventions: Verified appointment date/time/provider (7/24/2025 10:11 AM)  Has the patient kept scheduled  appointments due by today?: Yes (7/24/2025 10:11 AM)  Care Management Interventions: Advised patient to keep appointment (7/24/2025 10:11 AM)    Self Management  What is the home health agency?: [facility] (7/24/2025 10:11 AM)  What Durable Medical Equipment (DME) was ordered?: NA (7/24/2025 10:11 AM)    Patient Teaching  Does the patient have access to their discharge instructions?: Yes (7/24/2025 10:11 AM)  Care Management Interventions: Reviewed instructions with patient (7/24/2025 10:11 AM)  What is the patient's perception of their health status since discharge?: Improving (7/24/2025 10:11 AM)

## 2025-07-29 ENCOUNTER — APPOINTMENT (OUTPATIENT)
Dept: PRIMARY CARE | Facility: CLINIC | Age: 80
End: 2025-07-29
Payer: MEDICARE

## 2025-08-01 ENCOUNTER — OFFICE VISIT (OUTPATIENT)
Dept: PRIMARY CARE | Facility: CLINIC | Age: 80
End: 2025-08-01
Payer: MEDICARE

## 2025-08-01 VITALS
BODY MASS INDEX: 33.48 KG/M2 | DIASTOLIC BLOOD PRESSURE: 78 MMHG | HEART RATE: 80 BPM | SYSTOLIC BLOOD PRESSURE: 122 MMHG | WEIGHT: 163 LBS | RESPIRATION RATE: 18 BRPM | OXYGEN SATURATION: 96 %

## 2025-08-01 DIAGNOSIS — B35.3 TINEA PEDIS OF BOTH FEET: ICD-10-CM

## 2025-08-01 DIAGNOSIS — I48.92 ATRIAL FIB/FLUTTER, TRANSIENT (MULTI): ICD-10-CM

## 2025-08-01 DIAGNOSIS — J18.9 PNEUMONIA DUE TO INFECTIOUS ORGANISM, UNSPECIFIED LATERALITY, UNSPECIFIED PART OF LUNG: Primary | ICD-10-CM

## 2025-08-01 DIAGNOSIS — I26.99 OTHER PULMONARY EMBOLISM WITHOUT ACUTE COR PULMONALE, UNSPECIFIED CHRONICITY (MULTI): ICD-10-CM

## 2025-08-01 DIAGNOSIS — I48.91 ATRIAL FIB/FLUTTER, TRANSIENT (MULTI): ICD-10-CM

## 2025-08-01 PROCEDURE — 1159F MED LIST DOCD IN RCRD: CPT | Performed by: FAMILY MEDICINE

## 2025-08-01 PROCEDURE — 99495 TRANSJ CARE MGMT MOD F2F 14D: CPT | Performed by: FAMILY MEDICINE

## 2025-08-01 RX ORDER — OXYCODONE AND ACETAMINOPHEN 5; 325 MG/1; MG/1
1 TABLET ORAL EVERY 6 HOURS PRN
COMMUNITY

## 2025-08-01 RX ORDER — METHOCARBAMOL 750 MG/1
750 TABLET, FILM COATED ORAL 4 TIMES DAILY
COMMUNITY
Start: 2025-07-10

## 2025-08-01 RX ORDER — CLOTRIMAZOLE AND BETAMETHASONE DIPROPIONATE 10; .64 MG/G; MG/G
CREAM TOPICAL
Qty: 45 G | Refills: 0 | Status: SHIPPED | OUTPATIENT
Start: 2025-08-01

## 2025-08-01 ASSESSMENT — ENCOUNTER SYMPTOMS
BACK PAIN: 1
DIAPHORESIS: 0
APNEA: 0
RHINORRHEA: 0
POLYPHAGIA: 0
EYE REDNESS: 0
GASTROINTESTINAL NEGATIVE: 1
DYSURIA: 0
FEVER: 0
ACTIVITY CHANGE: 1
SHORTNESS OF BREATH: 0
CARDIOVASCULAR NEGATIVE: 1
CHOKING: 0
RECTAL PAIN: 0
SORE THROAT: 0
BLOOD IN STOOL: 0

## 2025-08-01 ASSESSMENT — COLUMBIA-SUICIDE SEVERITY RATING SCALE - C-SSRS
2. HAVE YOU ACTUALLY HAD ANY THOUGHTS OF KILLING YOURSELF?: NO
6. HAVE YOU EVER DONE ANYTHING, STARTED TO DO ANYTHING, OR PREPARED TO DO ANYTHING TO END YOUR LIFE?: NO
1. IN THE PAST MONTH, HAVE YOU WISHED YOU WERE DEAD OR WISHED YOU COULD GO TO SLEEP AND NOT WAKE UP?: NO

## 2025-08-01 ASSESSMENT — PATIENT HEALTH QUESTIONNAIRE - PHQ9
2. FEELING DOWN, DEPRESSED OR HOPELESS: NOT AT ALL
SUM OF ALL RESPONSES TO PHQ9 QUESTIONS 1 AND 2: 0
1. LITTLE INTEREST OR PLEASURE IN DOING THINGS: NOT AT ALL

## 2025-08-01 NOTE — PATIENT INSTRUCTIONS
Hospital notes were reviewed medications reviewed and reconciled.    Will need to be on Eliquis for full 6 months.  Chest x-ray is going to be performed in 4 weeks.    Please follow-up with cardiology Dr. Pelaez.  Whom you are established with.  It is important to have echocardiogram done in 5 to 6 weeks    If troubles with getting into see the cardiologist I will be happy to order this for you.    Because of the anemia I am going to have you do a CBC with differential and basic metabolic panel.    You did have an episode of an abnormal heart rhythm while you are in the hospital a monitor is on at this time and this will be reviewed.

## 2025-08-01 NOTE — PROGRESS NOTES
"Patient: Julia Antonio  : 1945  PCP: Heaven Fletcher DO  MRN: [id number]  Program: No linked episodes     Julia Antonio is a 80 y.o. female presenting today for follow-up after being discharged from the hospital 9 days ago. The main problem requiring admission was pulmonary embolism. The discharge summary and/or Transitional Care Management documentation was reviewed. Medication reconciliation was performed as indicated via the \"Fidel as Reviewed\" timestamp.     Julia Antonio was contacted by Transitional Care Management services two days after her discharge. This encounter and supporting documentation was reviewed.  Patient presented with shortness of breath chills cough and lower back pain.  There was concern that patient may have pneumonia again had a previous knee surgery in emergency department x-ray of the chest showed possible infection in the lower part of left lower ultrasound showed no blood clots in the legs but CT of the chest found a small blood clot in the left lung and areas of both lungs look like either infection or collapsed air sacs.    Patient was admitted to hospital for mild pneumonia and pulmonary embolism.  Patient was started on blood thinner Eliquis 5 mg twice daily and antibiotic ceftriaxone and doxycycline.    Home heart ultrasound shows echocardiogram with ejection fraction 42%.  Stiffened area of the muscle was appreciated.  This was reviewed by cardiologist who noted episode of atrial fibrillation and recommended outpatient follow-up.  Needs to have repeat echocardiogram performed in 6 weeks    Patient was also getting a Holter monitor.  Will see cardiology in about 2 months  The complexity of medical decision making for this patient's transitional care is moderate.    Review of Systems   Constitutional:  Positive for activity change. Negative for diaphoresis and fever.   HENT:  Negative for congestion, ear discharge, hearing loss, rhinorrhea and sore throat.    Eyes:  " Negative for redness and visual disturbance.   Respiratory:  Negative for apnea, choking and shortness of breath.    Cardiovascular: Negative.    Gastrointestinal: Negative.  Negative for blood in stool and rectal pain.   Endocrine: Negative for heat intolerance and polyphagia.   Genitourinary:  Negative for dysuria, pelvic pain and vaginal discharge.   Musculoskeletal:  Positive for back pain.   Skin: Negative.        Family History[1]    No data recorded    No follow-ups on file.    Objective   Wt 73.9 kg (163 lb)   BMI 33.48 kg/m²   BSA Body surface area is 1.75 meters squared.    Physical Exam  HENT:      Head: Normocephalic and atraumatic.      Nose: Nose normal.     Eyes:      Extraocular Movements: Extraocular movements intact.      Pupils: Pupils are equal, round, and reactive to light.       Cardiovascular:      Rate and Rhythm: Normal rate and regular rhythm.      Heart sounds: Murmur heard.   Pulmonary:      Effort: Pulmonary effort is normal.      Breath sounds: Normal breath sounds.   Abdominal:      General: Abdomen is flat.      Tenderness: There is no abdominal tenderness.     Musculoskeletal:      Cervical back: No rigidity or tenderness.      Comments: Wearing a back brace from previous surgery     Skin:     Comments: Slight erythema and scaling of the feet bilaterally     Neurological:      General: No focal deficit present.      Mental Status: She is alert and oriented to person, place, and time.     No calf tenderness  Lab on 08/23/2024   Component Date Value Ref Range Status    Methylmalonic Acid, S 08/23/2024 0.17  0.00 - 0.40 umol/L Final    INTERPRETIVE INFORMATION: MMA Serum/Plasma,                             Vitamin B12 Status    This test was developed and its performance characteristics   determined by [facility]. It has not been cleared or   approved by the US Food and Drug Administration. This test was   performed in a CLIA certified laboratory and is intended for   clinical  purposes.  Performed By: [facility]  [address] [place] [id number]  : Carlos Myrick MD, PhD  CLIA Number: [id number]   Lab on 08/19/2024   Component Date Value Ref Range Status    WBC 08/19/2024 4.5  4.4 - 11.3 x10*3/uL Final    nRBC 08/19/2024 0.0  0.0 - 0.0 /100 WBCs Final    RBC 08/19/2024 4.20  4.00 - 5.20 x10*6/uL Final    Hemoglobin 08/19/2024 12.2  12.0 - 16.0 g/dL Final    Hematocrit 08/19/2024 38.6  36.0 - 46.0 % Final    MCV 08/19/2024 92  80 - 100 fL Final    MCH 08/19/2024 29.0  26.0 - 34.0 pg Final    MCHC 08/19/2024 31.6 (L)  32.0 - 36.0 g/dL Final    RDW 08/19/2024 12.7  11.5 - 14.5 % Final    Platelets 08/19/2024 218  150 - 450 x10*3/uL Final    Neutrophils % 08/19/2024 59.3  40.0 - 80.0 % Final    Immature Granulocytes %, Automated 08/19/2024 0.4  0.0 - 0.9 % Final    Immature Granulocyte Count (IG) includes promyelocytes, myelocytes and metamyelocytes but does not include bands. Percent differential counts (%) should be interpreted in the context of the absolute cell counts (cells/UL).    Lymphocytes % 08/19/2024 25.7  13.0 - 44.0 % Final    Monocytes % 08/19/2024 11.4  2.0 - 10.0 % Final    Eosinophils % 08/19/2024 2.5  0.0 - 6.0 % Final    Basophils % 08/19/2024 0.7  0.0 - 2.0 % Final    Neutrophils Absolute 08/19/2024 2.66  1.60 - 5.50 x10*3/uL Final    Percent differential counts (%) should be interpreted in the context of the absolute cell counts (cells/uL).    Immature Granulocytes Absolute, Au* 08/19/2024 0.02  0.00 - 0.50 x10*3/uL Final    Lymphocytes Absolute 08/19/2024 1.15  0.80 - 3.00 x10*3/uL Final    Monocytes Absolute 08/19/2024 0.51  0.05 - 0.80 x10*3/uL Final    Eosinophils Absolute 08/19/2024 0.11  0.00 - 0.40 x10*3/uL Final    Basophils Absolute 08/19/2024 0.03  0.00 - 0.10 x10*3/uL Final    Glucose 08/19/2024 97  74 - 99 mg/dL Final    Sodium 08/19/2024 140  136 - 145 mmol/L Final    Potassium 08/19/2024 4.4  3.5 - 5.3 mmol/L Final     Chloride 08/19/2024 104  98 - 107 mmol/L Final    Bicarbonate 08/19/2024 28  21 - 32 mmol/L Final    Anion Gap 08/19/2024 12  10 - 20 mmol/L Final    Urea Nitrogen 08/19/2024 15  6 - 23 mg/dL Final    Creatinine 08/19/2024 0.70  0.50 - 1.05 mg/dL Final    eGFR 08/19/2024 87  >60 mL/min/1.73m*2 Final    Calculations of estimated GFR are performed using the 2021 CKD-EPI Study Refit equation without the race variable for the IDMS-Traceable creatinine methods.  https://jasn.asnjournals.org/content/early/2021/09/22/ASN.7954521597    Calcium 08/19/2024 9.2  8.6 - 10.6 mg/dL Final    Albumin 08/19/2024 4.0  3.4 - 5.0 g/dL Final    Alkaline Phosphatase 08/19/2024 83  33 - 136 U/L Final    Total Protein 08/19/2024 6.5  6.4 - 8.2 g/dL Final    AST 08/19/2024 18  9 - 39 U/L Final    Bilirubin, Total 08/19/2024 0.4  0.0 - 1.2 mg/dL Final    ALT 08/19/2024 17  7 - 45 U/L Final    Patients treated with Sulfasalazine may generate falsely decreased results for ALT.    Cholesterol 08/19/2024 187  0 - 199 mg/dL Final          Age      Desirable   Borderline High   High     0-19 Y     0 - 169       170 - 199     >/= 200    20-24 Y     0 - 189       190 - 224     >/= 225         >24 Y     0 - 199       200 - 239     >/= 240   **All ranges are based on fasting samples. Specific   therapeutic targets will vary based on patient-specific   cardiac risk.    Pediatric guidelines reference:Pediatrics 2011, 128(S5).Adult guidelines reference: NCEP ATPIII Guidelines,SAURABH 2001, 258:2486-97    Venipuncture immediately after or during the administration of Metamizole may lead to falsely low results. Testing should be performed immediately prior to Metamizole dosing.    HDL-Cholesterol 08/19/2024 53.9  mg/dL Final      Age       Very Low   Low     Normal    High    0-19 Y    < 35      < 40     40-45     ----  20-24 Y    ----     < 40      >45      ----        >24 Y      ----     < 40     40-60      >60      Cholesterol/HDL Ratio 08/19/2024 3.5    Final      Ref Values  Desirable  < 3.4  High Risk  > 5.0    LDL Calculated 08/19/2024 108 (H)  <=99 mg/dL Final                                Near   Borderline      AGE      Desirable  Optimal    High     High     Very High     0-19 Y     0 - 109     ---    110-129   >/= 130     ----    20-24 Y     0 - 119     ---    120-159   >/= 160     ----      >24 Y     0 -  99   100-129  130-159   160-189     >/=190      VLDL 08/19/2024 25  0 - 40 mg/dL Final    Triglycerides 08/19/2024 124  0 - 149 mg/dL Final       Age         Desirable   Borderline High   High     Very High   0 D-90 D    19 - 174         ----         ----        ----  91 D- 9 Y     0 -  74        75 -  99     >/= 100      ----    10-19 Y     0 -  89        90 - 129     >/= 130      ----    20-24 Y     0 - 114       115 - 149     >/= 150      ----         >24 Y     0 - 149       150 - 199    200- 499    >/= 500    Venipuncture immediately after or during the administration of Metamizole may lead to falsely low results. Testing should be performed immediately prior to Metamizole dosing.    Non HDL Cholesterol 08/19/2024 133  0 - 149 mg/dL Final          Age       Desirable   Borderline High   High     Very High     0-19 Y     0 - 119       120 - 144     >/= 145    >/= 160    20-24 Y     0 - 149       150 - 189     >/= 190      ----         >24 Y    30 mg/dL above LDL Cholesterol goal      Vitamin B12 08/19/2024 322  211 - 911 pg/mL Final    Vitamin D, 25-Hydroxy, Total 08/19/2024 36  30 - 100 ng/mL Final    Thyroid Stimulating Hormone 08/19/2024 2.41  0.44 - 3.98 mIU/L Final     Medications Ordered Prior to Encounter[2]  No images are attached to the encounter.            Assessment/Plan   Problem List Items Addressed This Visit           ICD-10-CM    Pneumonia due to infectious organism - Primary J18.9    Treated inpatient antibiotics and has completed outpatient antibiotic therapy and doing well.  Will need to recheck chest x-ray in 4 weeks to ensure  pneumonia has cleared         Atrial fib/flutter, transient (Multi) I48.91, I48.92    Appears resolved wearing a monitor at this time will be following up with Dr. Corado for cardiology         Other pulmonary embolism without acute cor pulmonale I26.99    Will need to be on Eliquis for full 6 months                     [1]   Family History  Problem Relation Name Age of Onset    Heart disease Mother          unknown    Other (cerebral hemorage) Father          Passed in his late 50's    No Known Problems Sister     [2]   Current Outpatient Medications on File Prior to Visit   Medication Sig Dispense Refill    apixaban (Eliquis) 5 mg tablet Take 1 tablet (5 mg) by mouth twice a day.      cholecalciferol (Vitamin D-3) 125 mcg (5,000 units) capsule Take 1 capsule (125 mcg) by mouth early in the morning..      clotrimazole-betamethasone (Lotrisone) cream Apply 1 Application topically 2 times a day. 15 g 0    clotrimazole-betamethasone (Lotrisone) cream Apply 1 Application topically 2 times a day. 45 g 0    esomeprazole (NexIUM) 40 mg DR capsule TAKE 1 CAPSULE BY MOUTH EVERY DAY AS DIRECTED NOT COVERED      metoprolol succinate XL (Toprol-XL) 25 mg 24 hr tablet Take 1 tablet (25 mg) by mouth once daily.      multivitamin with minerals (Multiple Vitamin-Minerals) tablet Take 1 tablet by mouth once daily.      nystatin (Mycostatin) 100,000 unit/gram powder Apply 1 Application topically 2 times a day. 30 g 1    pitavastatin calcium 4 mg tablet Take 4 mg by mouth at 3:00 in the morning. 90 tablet 3    valsartan (Diovan) 40 mg tablet Take 1 tablet (40 mg) by mouth once daily.       No current facility-administered medications on file prior to visit.

## 2025-08-01 NOTE — ASSESSMENT & PLAN NOTE
Appears resolved wearing a monitor at this time will be following up with Dr. Corado for cardiology

## 2025-08-01 NOTE — ASSESSMENT & PLAN NOTE
Treated inpatient antibiotics and has completed outpatient antibiotic therapy and doing well.  Will need to recheck chest x-ray in 4 weeks to ensure pneumonia has cleared

## 2025-08-05 ENCOUNTER — PATIENT OUTREACH (OUTPATIENT)
Dept: PRIMARY CARE | Facility: CLINIC | Age: 80
End: 2025-08-05
Payer: MEDICARE

## 2025-08-05 NOTE — PROGRESS NOTES
Confirmation of at least 2 patient identifiers.    Completed telephonic follow-up with patient after recent visit with Dr. Amaro.    Spoke to patient during outreach call.    Patient reports feeling: Improved    Patient has questions or concerns about medications: No    Have all prescribed medications been filled? Yes    Patient has necessary resources to manage their care? Yes    Patient has questions or concerns? No    Next care management follow-up approximately within one month.  Care  information provided to patient.

## 2025-08-13 LAB
ANION GAP SERPL CALCULATED.4IONS-SCNC: 11 MMOL/L (CALC) (ref 7–17)
BASOPHILS # BLD AUTO: 18 CELLS/UL (ref 0–200)
BASOPHILS NFR BLD AUTO: 0.4 %
BUN SERPL-MCNC: 10 MG/DL (ref 7–25)
BUN/CREAT SERPL: NORMAL (CALC) (ref 6–22)
CALCIUM SERPL-MCNC: 9.1 MG/DL (ref 8.6–10.4)
CHLORIDE SERPL-SCNC: 103 MMOL/L (ref 98–110)
CO2 SERPL-SCNC: 26 MMOL/L (ref 20–32)
CREAT SERPL-MCNC: 0.6 MG/DL (ref 0.6–0.95)
EGFRCR SERPLBLD CKD-EPI 2021: 90 ML/MIN/1.73M2
EOSINOPHIL # BLD AUTO: 230 CELLS/UL (ref 15–500)
EOSINOPHIL NFR BLD AUTO: 5.1 %
ERYTHROCYTE [DISTWIDTH] IN BLOOD BY AUTOMATED COUNT: 12.5 % (ref 11–15)
GLUCOSE SERPL-MCNC: 90 MG/DL (ref 65–99)
HCT VFR BLD AUTO: 34 % (ref 35–45)
HGB BLD-MCNC: 10.9 G/DL (ref 11.7–15.5)
LYMPHOCYTES # BLD AUTO: 1125 CELLS/UL (ref 850–3900)
LYMPHOCYTES NFR BLD AUTO: 25 %
MCH RBC QN AUTO: 29.6 PG (ref 27–33)
MCHC RBC AUTO-ENTMCNC: 32.1 G/DL (ref 32–36)
MCV RBC AUTO: 92.4 FL (ref 80–100)
MONOCYTES # BLD AUTO: 527 CELLS/UL (ref 200–950)
MONOCYTES NFR BLD AUTO: 11.7 %
NEUTROPHILS # BLD AUTO: 2601 CELLS/UL (ref 1500–7800)
NEUTROPHILS NFR BLD AUTO: 57.8 %
PLATELET # BLD AUTO: 202 THOUSAND/UL (ref 140–400)
PMV BLD REES-ECKER: 10.3 FL (ref 7.5–12.5)
POTASSIUM SERPL-SCNC: 4.2 MMOL/L (ref 3.5–5.3)
RBC # BLD AUTO: 3.68 MILLION/UL (ref 3.8–5.1)
SODIUM SERPL-SCNC: 140 MMOL/L (ref 135–146)
WBC # BLD AUTO: 4.5 THOUSAND/UL (ref 3.8–10.8)

## 2025-08-14 ENCOUNTER — TELEPHONE (OUTPATIENT)
Dept: PRIMARY CARE | Facility: CLINIC | Age: 80
End: 2025-08-14
Payer: MEDICARE

## 2025-08-14 DIAGNOSIS — D64.9 ANEMIA, UNSPECIFIED TYPE: ICD-10-CM

## 2025-08-26 ENCOUNTER — APPOINTMENT (OUTPATIENT)
Dept: PRIMARY CARE | Facility: CLINIC | Age: 80
End: 2025-08-26
Payer: MEDICARE

## 2025-08-26 VITALS
WEIGHT: 161.4 LBS | SYSTOLIC BLOOD PRESSURE: 132 MMHG | HEIGHT: 59 IN | BODY MASS INDEX: 32.54 KG/M2 | HEART RATE: 79 BPM | DIASTOLIC BLOOD PRESSURE: 70 MMHG

## 2025-08-26 DIAGNOSIS — I48.91 ATRIAL FIB/FLUTTER, TRANSIENT (MULTI): ICD-10-CM

## 2025-08-26 DIAGNOSIS — H90.6 MIXED CONDUCTIVE AND SENSORINEURAL HEARING LOSS OF BOTH EARS: ICD-10-CM

## 2025-08-26 DIAGNOSIS — Z12.31 ENCOUNTER FOR SCREENING MAMMOGRAM FOR BREAST CANCER: ICD-10-CM

## 2025-08-26 DIAGNOSIS — Z00.00 ROUTINE GENERAL MEDICAL EXAMINATION AT HEALTH CARE FACILITY: ICD-10-CM

## 2025-08-26 DIAGNOSIS — Z86.73 HISTORY OF TIA (TRANSIENT ISCHEMIC ATTACK): ICD-10-CM

## 2025-08-26 DIAGNOSIS — I34.0 MITRAL VALVE INSUFFICIENCY, UNSPECIFIED ETIOLOGY: ICD-10-CM

## 2025-08-26 DIAGNOSIS — I48.92 ATRIAL FIB/FLUTTER, TRANSIENT (MULTI): ICD-10-CM

## 2025-08-26 DIAGNOSIS — Z78.0 ASYMPTOMATIC MENOPAUSAL STATE: ICD-10-CM

## 2025-08-26 DIAGNOSIS — K21.9 GASTROESOPHAGEAL REFLUX DISEASE, UNSPECIFIED WHETHER ESOPHAGITIS PRESENT: ICD-10-CM

## 2025-08-26 DIAGNOSIS — E78.00 HYPERCHOLESTEROLEMIA: ICD-10-CM

## 2025-08-26 DIAGNOSIS — R03.0 ELEVATED BLOOD PRESSURE READING WITHOUT DIAGNOSIS OF HYPERTENSION: ICD-10-CM

## 2025-08-26 DIAGNOSIS — Z00.00 HEALTHCARE MAINTENANCE: Primary | ICD-10-CM

## 2025-08-26 DIAGNOSIS — Z71.89 ADVANCE DIRECTIVE DISCUSSED WITH PATIENT: ICD-10-CM

## 2025-08-26 PROBLEM — M54.16 LUMBAR RADICULOPATHY: Status: ACTIVE | Noted: 2025-07-10

## 2025-08-26 PROBLEM — G45.9 TIA (TRANSIENT ISCHEMIC ATTACK): Status: RESOLVED | Noted: 2023-08-17 | Resolved: 2025-08-26

## 2025-08-26 PROBLEM — J18.9 PNEUMONIA DUE TO INFECTIOUS ORGANISM: Status: RESOLVED | Noted: 2025-08-01 | Resolved: 2025-08-26

## 2025-08-26 PROCEDURE — 99214 OFFICE O/P EST MOD 30 MIN: CPT | Performed by: FAMILY MEDICINE

## 2025-08-26 PROCEDURE — 1159F MED LIST DOCD IN RCRD: CPT | Performed by: FAMILY MEDICINE

## 2025-08-26 PROCEDURE — G0439 PPPS, SUBSEQ VISIT: HCPCS | Performed by: FAMILY MEDICINE

## 2025-08-26 PROCEDURE — 1160F RVW MEDS BY RX/DR IN RCRD: CPT | Performed by: FAMILY MEDICINE

## 2025-08-26 PROCEDURE — 99497 ADVNCD CARE PLAN 30 MIN: CPT | Performed by: FAMILY MEDICINE

## 2025-08-26 PROCEDURE — 1158F ADVNC CARE PLAN TLK DOCD: CPT | Performed by: FAMILY MEDICINE

## 2025-08-26 PROCEDURE — 1170F FXNL STATUS ASSESSED: CPT | Performed by: FAMILY MEDICINE

## 2025-08-26 ASSESSMENT — ENCOUNTER SYMPTOMS
FEVER: 0
PALPITATIONS: 0
APPETITE CHANGE: 0
DEPRESSION: 0
COLOR CHANGE: 0
COUGH: 0
ARTHRALGIAS: 0
LOSS OF SENSATION IN FEET: 0
HEADACHES: 0
FATIGUE: 0
DIZZINESS: 0
ACTIVITY CHANGE: 0
LIGHT-HEADEDNESS: 0
CHOKING: 0
CHEST TIGHTNESS: 0
BACK PAIN: 0
FACIAL ASYMMETRY: 0
OCCASIONAL FEELINGS OF UNSTEADINESS: 0

## 2025-08-26 ASSESSMENT — ACTIVITIES OF DAILY LIVING (ADL)
MANAGING_FINANCES: INDEPENDENT
TAKING_MEDICATION: INDEPENDENT
DOING_HOUSEWORK: INDEPENDENT
BATHING: INDEPENDENT
GROCERY_SHOPPING: INDEPENDENT
DRESSING: INDEPENDENT

## 2025-08-26 ASSESSMENT — PATIENT HEALTH QUESTIONNAIRE - PHQ9
2. FEELING DOWN, DEPRESSED OR HOPELESS: NOT AT ALL
1. LITTLE INTEREST OR PLEASURE IN DOING THINGS: NOT AT ALL
SUM OF ALL RESPONSES TO PHQ9 QUESTIONS 1 AND 2: 0